# Patient Record
Sex: MALE | Race: WHITE | NOT HISPANIC OR LATINO | Employment: FULL TIME | URBAN - METROPOLITAN AREA
[De-identification: names, ages, dates, MRNs, and addresses within clinical notes are randomized per-mention and may not be internally consistent; named-entity substitution may affect disease eponyms.]

---

## 2017-03-13 DIAGNOSIS — F33.1 MAJOR DEPRESSIVE DISORDER, RECURRENT EPISODE, MODERATE (H): ICD-10-CM

## 2017-03-13 RX ORDER — CITALOPRAM HYDROBROMIDE 20 MG/1
TABLET ORAL
Qty: 90 TABLET | Refills: 0 | OUTPATIENT
Start: 2017-03-13

## 2017-03-13 NOTE — TELEPHONE ENCOUNTER
Refused Prescriptions:                       Disp   Refills    citalopram (CELEXA) 20 MG tablet [Pharmacy*90 tab*0        Sig: TAKE 1 TABLET(20 MG) BY MOUTH DAILY  Refused By: GALLITO VALDIVIA  Reason for Refusal: Request already responded to by other means (phone, fax, etc.)  Reason for Refusal Comment: called in 30 today 3-    Sherin  [phone] (home)

## 2017-05-02 ENCOUNTER — TELEPHONE (OUTPATIENT)
Dept: FAMILY MEDICINE | Facility: CLINIC | Age: 57
End: 2017-05-02

## 2017-08-04 ENCOUNTER — OFFICE VISIT (OUTPATIENT)
Dept: FAMILY MEDICINE | Facility: CLINIC | Age: 57
End: 2017-08-04

## 2017-08-04 VITALS
WEIGHT: 226.8 LBS | SYSTOLIC BLOOD PRESSURE: 134 MMHG | TEMPERATURE: 98.6 F | BODY MASS INDEX: 31.75 KG/M2 | HEIGHT: 71 IN | OXYGEN SATURATION: 96 % | HEART RATE: 75 BPM | DIASTOLIC BLOOD PRESSURE: 86 MMHG

## 2017-08-04 DIAGNOSIS — R41.9 COGNITIVE COMPLAINTS: ICD-10-CM

## 2017-08-04 DIAGNOSIS — F33.1 MODERATE EPISODE OF RECURRENT MAJOR DEPRESSIVE DISORDER (H): Primary | ICD-10-CM

## 2017-08-04 DIAGNOSIS — Z23 NEED FOR VACCINATION: ICD-10-CM

## 2017-08-04 DIAGNOSIS — Z71.89 ACP (ADVANCE CARE PLANNING): ICD-10-CM

## 2017-08-04 PROCEDURE — 99214 OFFICE O/P EST MOD 30 MIN: CPT | Mod: 25 | Performed by: FAMILY MEDICINE

## 2017-08-04 PROCEDURE — 90471 IMMUNIZATION ADMIN: CPT | Performed by: FAMILY MEDICINE

## 2017-08-04 PROCEDURE — 90715 TDAP VACCINE 7 YRS/> IM: CPT | Performed by: FAMILY MEDICINE

## 2017-08-04 RX ORDER — BUPROPION HCL 150 MG
150 TABLET,SUSTAINED-RELEASE 12 HR ORAL 2 TIMES DAILY
Qty: 60 TABLET | Refills: 1 | Status: SHIPPED | OUTPATIENT
Start: 2017-08-04 | End: 2017-09-28

## 2017-08-04 ASSESSMENT — PATIENT HEALTH QUESTIONNAIRE - PHQ9: SUM OF ALL RESPONSES TO PHQ QUESTIONS 1-9: 13

## 2017-08-04 NOTE — PROGRESS NOTES
SUBJECTIVE:                                                    Shaquille Roberts is a 56 year old male who presents to clinic today for the following health issues:    Worries that he has ADD- has problems at work-  Multiple tasks , not completing  He believes he  under performed at college-   Wished he had neuropsych testing first year in college    Not drinking alcohol- he has concerns about his memory  New job 18 months ago- likes, less stress  Not exercising    Family history of cognitive impairment- this is also a worry for him  Mother is 82- having some cognitive problems    History of depression: symptoms are not in remission  PHQ9 scores 13     Current substance abuse:  None  Anxiety or Panic symptoms:  No    PHQ-9  English  PHQ-9   Any Language      Amount of exercise or physical activity: no regular exercise    Problems taking medications regularly: No    Medication side effects: none  Diet: regular (no restrictions)      Problem list and histories reviewed & adjusted, as indicated.  Additional history: as documented    Patient Active Problem List   Diagnosis     Other type of migraine     Health Care Home     Major depressive disorder, recurrent episode, moderate (H)     Non morbid obesity due to excess calories     ACP (advance care planning)     Past Surgical History:   Procedure Laterality Date     C ANESTH,LUMBAR DISCOGRAPHY  2003    L3L4       COLONOSCOPY  9/2016    polyps/ repeat in 5 yrs     REMOVAL OF SPERM DUCT(S)  2/04    Dr Gomez       Social History   Substance Use Topics     Smoking status: Former Smoker     Packs/day: 1.00     Years: 15.00     Types: Cigarettes     Quit date: 1/1/1999     Smokeless tobacco: Never Used     Alcohol use 1.0 oz/week     2 drink(s) per week     Family History   Problem Relation Age of Onset     DIABETES No family hx of      C.A.D. No family hx of      CEREBROVASCULAR DISEASE Maternal Grandfather      in his 50s     Cancer - colorectal No family hx of       "Prostate Cancer No family hx of      Alzheimer Disease No family hx of      Hypertension Mother          Current Outpatient Prescriptions   Medication Sig Dispense Refill     WELLBUTRIN  MG 12 hr tablet Take 1 tablet (150 mg) by mouth 2 times daily 60 tablet 1     citalopram (CELEXA) 20 MG tablet Take 1 tablet (20 mg) by mouth daily 30 tablet 0     SUMAtriptan (IMITREX) 50 MG tablet Take 1 tablet (50 mg) by mouth See Admin Instructions 18 tablet 2         Reviewed and updated as needed this visit by clinical staffTobacco  Allergies  Meds  Problems       Reviewed and updated as needed this visit by Provider         ROS:  Constitutional, HEENT, cardiovascular, pulmonary, gi and gu systems are negative, except as otherwise noted.      OBJECTIVE:   /86 (BP Location: Right arm, Patient Position: Chair, Cuff Size: Adult Large)  Pulse 75  Temp 98.6  F (37  C) (Oral)  Ht 1.803 m (5' 11\")  Wt 102.9 kg (226 lb 12.8 oz)  SpO2 96%  BMI 31.63 kg/m2  Body mass index is 31.63 kg/(m^2).   ALert and oriented times 3; Coherent speech, normal rate and volume, able to articulate, logical thoughts, able to abstract reason, no tangential thoughts, no hallucinations or delusions Affect is pleasant      Diagnostic Test Results:  none     ASSESSMENT/PLAN:     Problem List Items Addressed This Visit     ACP (advance care planning)      Other Visit Diagnoses     Moderate episode of recurrent major depressive disorder (H)    -  Primary    Relevant Medications    WELLBUTRIN  MG 12 hr tablet    Need for vaccination        Relevant Orders    TDAP VACCINE (BOOSTRIX) (Completed)    VACCINE ADMINISTRATION, INITIAL (Completed)           CONSULTATION/REFERRAL for neuropsych testing- will have Merly help with provider based on insurance  Change in antidepressant-   DC Citalopram  Trial of wellbutrin    Recheck 4-8 weeks  Henny Gonzalez MD  [facility]    More than 50% of visit spent in " counseling.

## 2017-08-04 NOTE — MR AVS SNAPSHOT
"              After Visit Summary   2017    Shaquille Roberts    MRN: [id number]           Patient Information     Date Of Birth          1960        Visit Information        Provider Department      2017 12:00 PM Henny Gonzalez MD [facility]        Today's Diagnoses     Moderate episode of recurrent major depressive disorder (H)    -  1    ACP (advance care planning)        Need for vaccination          Care Instructions    Recheck in one month          Follow-ups after your visit        Who to contact     If you have questions or need follow up information about today's clinic visit or your schedule please contact [facility] directly at [phone].  Normal or non-critical lab and imaging results will be communicated to you by [employer]shart, letter or phone within 4 business days after the clinic has received the results. If you do not hear from us within 7 days, please contact the clinic through [employer] or phone. If you have a critical or abnormal lab result, we will notify you by phone as soon as possible.  Submit refill requests through Reading Room or call your pharmacy and they will forward the refill request to us. Please allow 3 business days for your refill to be completed.          Additional Information About Your Visit        MyChart Information     Reading Room lets you send messages to your doctor, view your test results, renew your prescriptions, schedule appointments and more. To sign up, go to [email or web address] . Click on \"Log in\" on the left side of the screen, which will take you to the Welcome page. Then click on \"Sign up Now\" on the right side of the page.     You will be asked to enter the access code listed below, as well as some personal information. Please follow the directions to create your username and password.     Your access code is: [email or web address]  Expires: 2017 12:39 PM     Your access code will  in 90 days. If you need " "help or a new code, please call your [place] clinic or [phone].        Care EveryWhere ID     This is your Care EveryWhere ID. This could be used by other organizations to access your [place] medical records  [id number]        Your Vitals Were     Pulse Temperature Height Pulse Oximetry BMI (Body Mass Index)       75 98.6  F (37  C) (Oral) 1.803 m (5' 11\") 96% 31.63 kg/m2        Blood Pressure from Last 3 Encounters:   08/04/17 134/86   08/03/16 122/84   01/07/16 132/80    Weight from Last 3 Encounters:   08/04/17 102.9 kg (226 lb 12.8 oz)   08/03/16 103.1 kg (227 lb 3.2 oz)   01/07/16 102.3 kg (225 lb 9.6 oz)              We Performed the Following     TDAP VACCINE (BOOSTRIX)     VACCINE ADMINISTRATION, INITIAL          Today's Medication Changes          These changes are accurate as of: 8/4/17 12:39 PM.  If you have any questions, ask your nurse or doctor.               Start taking these medicines.        Dose/Directions    WELLBUTRIN  MG 12 hr tablet   Used for:  Moderate episode of recurrent major depressive disorder (H)   Generic drug:  buPROPion   Started by:  Henny Gonzalez MD        Dose:  150 mg   Take 1 tablet (150 mg) by mouth 2 times daily   Quantity:  60 tablet   Refills:  1            Where to get your medicines      These medications were sent to [id number]  [place] - [address]  [address], [place] [address]     Phone:  [phone]     WELLBUTRIN  MG 12 hr tablet                Primary Care Provider Office Phone # Fax #    Henny Gonzalez [phone]       [facility] [address]  [facility] [address]        Equal Access to Services     [facility]: Jeanette figueroa Somena, waaxda luqadaha, qaybta kaalmada adeileanayafrannie, mike poon . [facility] [phone].    ATENCIÓN: Si juliala renee, tiene a pizano disposición servicios " irma de asistencia lingüística. Jazzy acosta [phone].    We comply with applicable federal civil rights laws and [place] laws. We do not discriminate on the basis of race, color, national origin, age, disability sex, sexual orientation or gender identity.            Thank you!     Thank you for choosing [facility]  for your care. Our goal is always to provide you with excellent care. Hearing back from our patients is one way we can continue to improve our services. Please take a few minutes to complete the written survey that you may receive in the mail after your visit with us. Thank you!             Your Updated Medication List - Protect others around you: Learn how to safely use, store and throw away your medicines at www.disposemymeds.org.          This list is accurate as of: 8/4/17 12:39 PM.  Always use your most recent med list.                   Brand Name Dispense Instructions for use Diagnosis    citalopram 20 MG tablet    celeXA    30 tablet    Take 1 tablet (20 mg) by mouth daily    Major depressive disorder, recurrent episode, moderate (H)       SUMAtriptan 50 MG tablet    IMITREX    18 tablet    Take 1 tablet (50 mg) by mouth See Admin Instructions    Migraine, unspecified, without mention of intractable migraine without mention of status migrainosus       WELLBUTRIN  MG 12 hr tablet   Generic drug:  buPROPion     60 tablet    Take 1 tablet (150 mg) by mouth 2 times daily    Moderate episode of recurrent major depressive disorder (H)

## 2017-08-04 NOTE — NURSING NOTE
Shaquille is here for a medication recheck.- Pt is fasting.  Pt would like to find out which other options are available to him.     Pre-Visit Screening :  Immunizations : up to date  Colon Screening : is up to date  Asthma Action Test/Plan : NA  PHQ9/GAD7 :  Done today      Pulse - regular  My Chart - accepts    CLASSIFICATION OF OVERWEIGHT AND OBESITY BY BMI                         Obesity Class           BMI(kg/m2)  Underweight                                    < 18.5  Normal                                         18.5-24.9  Overweight                                     25.0-29.9  OBESITY                     I                  30.0-34.9                              II                 35.0-39.9  EXTREME OBESITY             III                >40                             Patient's  BMI Body mass index is 31.63 kg/(m^2).  http://hin.nhlbi.nih.gov/menuplanner/menu.cgi  Questioned patient about current smoking habits.  Pt. quit smoking some time ago.    LILY Ocampo ([facility])

## 2017-08-29 DIAGNOSIS — F33.1 MODERATE EPISODE OF RECURRENT MAJOR DEPRESSIVE DISORDER (H): ICD-10-CM

## 2017-08-29 RX ORDER — BUPROPION HYDROCHLORIDE 150 MG/1
TABLET, EXTENDED RELEASE ORAL
Qty: 180 TABLET | Refills: 1 | OUTPATIENT
Start: 2017-08-29

## 2017-08-29 NOTE — TELEPHONE ENCOUNTER
Refused Prescriptions:                       Disp   Refills    buPROPion (WELLBUTRIN SR) 150 MG 12 hr tab*180 ta*1        Sig: TAKE 1 TABLET(150 MG) BY MOUTH TWICE DAILY  Refused By: GALLITO VALDIVIA  Reason for Refusal: Patient needs appointment  Reason for Refusal Comment: refilled 8-2017 pt due for ov    [phone] (home)

## 2017-09-28 DIAGNOSIS — F33.1 MODERATE EPISODE OF RECURRENT MAJOR DEPRESSIVE DISORDER (H): ICD-10-CM

## 2017-09-28 RX ORDER — BUPROPION HYDROCHLORIDE 150 MG/1
TABLET, EXTENDED RELEASE ORAL
Qty: 60 TABLET | Refills: 0 | OUTPATIENT
Start: 2017-09-28

## 2017-09-28 RX ORDER — BUPROPION HCL 150 MG
150 TABLET,SUSTAINED-RELEASE 12 HR ORAL 2 TIMES DAILY
Qty: 60 TABLET | Refills: 0 | COMMUNITY
Start: 2017-09-28 | End: 2017-10-23

## 2017-09-28 NOTE — TELEPHONE ENCOUNTER
WELLBUTRIN  MG   [facility] Saunders   30 days   Pt due for a FASTING appt  E[place]  [phone] (home)

## 2017-10-23 ENCOUNTER — TELEPHONE (OUTPATIENT)
Dept: FAMILY MEDICINE | Facility: CLINIC | Age: 57
End: 2017-10-23

## 2017-10-23 DIAGNOSIS — F33.1 MODERATE EPISODE OF RECURRENT MAJOR DEPRESSIVE DISORDER (H): ICD-10-CM

## 2017-10-23 RX ORDER — BUPROPION HCL 150 MG
150 TABLET,SUSTAINED-RELEASE 12 HR ORAL 2 TIMES DAILY
Qty: 30 TABLET | Refills: 0 | COMMUNITY
Start: 2017-10-23 | End: 2017-11-10

## 2017-10-23 NOTE — TELEPHONE ENCOUNTER
Called in #30 of his Wellbutrin to [facility]. Pt has upcoming appointment and only needed two weeks.

## 2017-11-10 ENCOUNTER — OFFICE VISIT (OUTPATIENT)
Dept: FAMILY MEDICINE | Facility: CLINIC | Age: 57
End: 2017-11-10

## 2017-11-10 VITALS
SYSTOLIC BLOOD PRESSURE: 142 MMHG | RESPIRATION RATE: 20 BRPM | WEIGHT: 221.4 LBS | DIASTOLIC BLOOD PRESSURE: 78 MMHG | TEMPERATURE: 98.3 F | HEIGHT: 71 IN | BODY MASS INDEX: 31 KG/M2 | HEART RATE: 84 BPM

## 2017-11-10 DIAGNOSIS — Z13.9 SCREENING FOR CONDITION: ICD-10-CM

## 2017-11-10 DIAGNOSIS — F33.1 MODERATE EPISODE OF RECURRENT MAJOR DEPRESSIVE DISORDER (H): Primary | ICD-10-CM

## 2017-11-10 PROCEDURE — 36415 COLL VENOUS BLD VENIPUNCTURE: CPT | Performed by: FAMILY MEDICINE

## 2017-11-10 PROCEDURE — 80076 HEPATIC FUNCTION PANEL: CPT | Mod: 90 | Performed by: FAMILY MEDICINE

## 2017-11-10 PROCEDURE — 80061 LIPID PANEL: CPT | Mod: 90 | Performed by: FAMILY MEDICINE

## 2017-11-10 PROCEDURE — 99213 OFFICE O/P EST LOW 20 MIN: CPT | Performed by: FAMILY MEDICINE

## 2017-11-10 PROCEDURE — 86803 HEPATITIS C AB TEST: CPT | Mod: 90 | Performed by: FAMILY MEDICINE

## 2017-11-10 RX ORDER — BUPROPION HCL 150 MG
150 TABLET,SUSTAINED-RELEASE 12 HR ORAL 2 TIMES DAILY
Qty: 180 TABLET | Refills: 1 | Status: SHIPPED | OUTPATIENT
Start: 2017-11-10 | End: 2018-05-08

## 2017-11-10 ASSESSMENT — PATIENT HEALTH QUESTIONNAIRE - PHQ9: SUM OF ALL RESPONSES TO PHQ QUESTIONS 1-9: 3

## 2017-11-10 NOTE — PROGRESS NOTES
SUBJECTIVE:   Shaquille Roberts is a 56 year old male who presents to clinic today for the following health issues:      Depression Followup    Status since last visit: Improved     See PHQ-9 for current symptoms.      Other associated symptoms: problem with focus- improved on wellbutrin- better at multitasking at work     Complicating factors:   Significant life event:  No   Current substance abuse:  None- stopped drinking all alcohol- feels better  Anxiety or Panic symptoms:  No    PHQ-9 Score and MyChart F/U Questions 1/7/2016 8/3/2016 8/4/2017   Total Score 18 0 13   Q9: Suicide Ideation More than half the days Not at all More than half the days     In the past two weeks have you had thoughts of suicide or self-harm?  No.    Do you have concerns about your personal safety or the safety of others?   No     However, he does share that he had suicidal ideations in the first weeks of starting wellbutrin- this has passed    PHQ-9  English  PHQ-9   Any Language  Suicide Assessment Five-step Evaluation and Treatment (SAFE-T)      Amount of exercise or physical activity: discussion of need to make exercise routine- consider am before work    Problems taking medications regularly: No    Medication side effects: none    Diet: regular (no restrictions)      Problem list and histories reviewed & adjusted, as indicated.  Additional history: as documented    Patient Active Problem List   Diagnosis     Other type of migraine     Health Care Home     Major depressive disorder, recurrent episode, moderate (H)     Non morbid obesity due to excess calories     ACP (advance care planning)     Past Surgical History:   Procedure Laterality Date     C ANESTH,LUMBAR DISCOGRAPHY  2003    L3L4       COLONOSCOPY  9/2016    polyps/ repeat in 5 yrs     REMOVAL OF SPERM DUCT(S)  2/04    Dr Gomez       Social History   Substance Use Topics     Smoking status: Former Smoker     Packs/day: 1.00     Years: 15.00     Types: Cigarettes     Quit  "date: 1/1/1999     Smokeless tobacco: Never Used     Alcohol use 1.0 oz/week     2 drink(s) per week     Family History   Problem Relation Age of Onset     DIABETES No family hx of      C.A.D. No family hx of      CEREBROVASCULAR DISEASE Maternal Grandfather      in his 50s     Cancer - colorectal No family hx of      Prostate Cancer No family hx of      Alzheimer Disease No family hx of      Hypertension Mother          Current Outpatient Prescriptions   Medication Sig Dispense Refill     WELLBUTRIN  MG 12 hr tablet Take 1 tablet (150 mg) by mouth 2 times daily 180 tablet 1         Reviewed and updated as needed this visit by clinical staff     Reviewed and updated as needed this visit by Provider       ROS:  Constitutional, HEENT, cardiovascular, pulmonary, gi and gu systems are negative, except as otherwise noted.      OBJECTIVE:   /78 (BP Location: Right arm, Patient Position: Chair, Cuff Size: Adult Large)  Pulse 84  Temp 98.3  F (36.8  C) (Oral)  Resp 20  Ht 1.81 m (5' 11.25\")  Wt 100.4 kg (221 lb 6.4 oz)  BMI 30.66 kg/m2  Body mass index is 30.66 kg/(m^2).   ALert and oriented times 3; Coherent speech, normal rate and volume, able to articulate, logical thoughts, able to abstract reason, no tangential thoughts, no hallucinations or delusions. Affect is pleasant      Diagnostic Test Results:  Results for orders placed or performed in visit on 11/10/17   Lipid Profile ([facility])   Result Value Ref Range    Cholesterol 219 (H) <200 mg/dL    HDL Cholesterol 52 >40 mg/dL    Triglycerides 106 <150 mg/dL    LDL Cholesterol Calculated 145 (H) mg/dL (calc)    Cholesterol/HDL Ratio 4.2 <5.0 (calc)    Non HDL Cholesterol 167 (H) <130 mg/dL (calc)   Hepatits C antibody (QUEST)   Result Value Ref Range    HCV Antibody NON-REACTIVE NON-REACTIVE    SIGNAL TO CUT OFF - [facility] 0.01 <1.00   HEPATIC PANEL ([facility])   Result Value Ref Range    Protein Total 6.7 6.1 - 8.1 g/dL    Albumin 4.1 3.6 - 5.1 g/dL    Globulin " "Calculated 2.6 1.9 - 3.7 g/dL (calc)    A/G Ratio 1.6 1.0 - 2.5 (calc)    Bilirubin Total 0.5 0.2 - 1.2 mg/dL    Bilirubin Direct 0.1 < OR = 0.2 mg/dL    Bilirubin Indirect 0.4 0.2 - 1.2 mg/dL (calc)    Alkaline Phosphatase 77 40 - 115 U/L    AST 16 10 - 35 U/L    ALT 25 9 - 46 U/L       ASSESSMENT/PLAN:     Problem List Items Addressed This Visit     None      Visit Diagnoses     Moderate episode of recurrent major depressive disorder (H)    -  Primary    Relevant Medications    WELLBUTRIN  MG 12 hr tablet    Screening for condition        Relevant Orders    Lipid Profile (QUEST) (Completed)    VENOUS COLLECTION (Completed)    Hepatits C antibody (QUEST) (Completed)    HEPATIC PANEL (QUEST) (Completed)           BMI:   Estimated body mass index is 30.66 kg/(m^2) as calculated from the following:    Height as of this encounter: 1.81 m (5' 11.25\").    Weight as of this encounter: 100.4 kg (221 lb 6.4 oz).   Weight management plan: Discussed healthy diet and exercise guidelines and patient will follow up in 6 months in clinic to re-evaluate.        MEDICATIONS:  Continue current medications without change  FUTURE APPOINTMENTS:       - Follow-up visit in 6 months  Regular exercise    Henny Gonzalez MD  [facility]  "

## 2017-11-10 NOTE — MR AVS SNAPSHOT
"              After Visit Summary   11/10/2017    Shaquille Roberts    MRN: [id number]           Patient Information     Date Of Birth          1960        Visit Information        Provider Department      11/10/2017 7:30 AM Henny Gonzalez MD [facility]        Today's Diagnoses     Moderate episode of recurrent major depressive disorder (H)    -  1    Screening for condition           Follow-ups after your visit        Who to contact     If you have questions or need follow up information about today's clinic visit or your schedule please contact [facility] directly at [phone].  Normal or non-critical lab and imaging results will be communicated to you by MyChart, letter or phone within 4 business days after the clinic has received the results. If you do not hear from us within 7 days, please contact the clinic through Rescale[employer] or phone. If you have a critical or abnormal lab result, we will notify you by phone as soon as possible.  Submit refill requests through Vastari or call your pharmacy and they will forward the refill request to us. Please allow 3 business days for your refill to be completed.          Additional Information About Your Visit        MyChart Information     Vastari gives you secure access to your electronic health record. If you see a primary care provider, you can also send messages to your care team and make appointments. If you have questions, please call your primary care clinic.  If you do not have a primary care provider, please call [phone] and they will assist you.        Care EveryWhere ID     This is your Care EveryWhere ID. This could be used by other organizations to access your [place] medical records  [id number]        Your Vitals Were     Pulse Temperature Respirations Height BMI (Body Mass Index)       84 98.3  F (36.8  C) (Oral) 20 1.81 m (5' 11.25\") 30.66 kg/m2        Blood Pressure from Last 3 Encounters: "   11/10/17 142/78   08/04/17 134/86   08/03/16 122/84    Weight from Last 3 Encounters:   11/10/17 100.4 kg (221 lb 6.4 oz)   08/04/17 102.9 kg (226 lb 12.8 oz)   08/03/16 103.1 kg (227 lb 3.2 oz)              We Performed the Following     HEPATIC PANEL ([facility])     Hepatits C antibody (QUEST)     Lipid Profile (QUEST)     VENOUS COLLECTION          Where to get your medicines      These medications were sent to [facility] - [place], [place] - [address]  [address], [place] [address]     Phone:  [phone]     WELLBUTRIN  MG 12 hr tablet          Primary Care Provider Office Phone # Fax #    Henny Gonzalez [phone] [address] [address]        Equal Access to Services     [facility] : Hadii aad ku hadasho Soomaali, waaxda luqadaha, qaybta kaalmada adeegyada, waxay idiin haytawannan blanca poon . So [facility] [phone].    ATENCIÓN: Si habla español, tiene a pizano disposición servicios gratuitos de asistencia lingüística. LlMount St. [facility] [phone].    We comply with applicable federal civil rights laws and [place] laws. We do not discriminate on the basis of race, color, national origin, age, disability, sex, sexual orientation, or gender identity.            Thank you!     Thank you for choosing [facility]  for your care. Our goal is always to provide you with excellent care. Hearing back from our patients is one way we can continue to improve our services. Please take a few minutes to complete the written survey that you may receive in the mail after your visit with us. Thank you!             Your Updated Medication List - Protect others around you: Learn how to safely use, store and throw away your medicines at www.disposemymeds.org.          This list is accurate as of: 11/10/17 11:59 PM.  Always use your most recent med list.                   Brand Name Dispense  Instructions for use Diagnosis    WELLBUTRIN  MG 12 hr tablet   Generic drug:  buPROPion     180 tablet    Take 1 tablet (150 mg) by mouth 2 times daily    Moderate episode of recurrent major depressive disorder (H)

## 2017-11-10 NOTE — NURSING NOTE
Shaquille Roberts is here for a medication check and also fasting blood work.  Questioned patient about current smoking habits.  Pt. quit smoking some time ago.  PULSE regular  My Chart: active  CLASSIFICATION OF OVERWEIGHT AND OBESITY BY BMI                        Obesity Class           BMI(kg/m2)  Underweight                                    < 18.5  Normal                                         18.5-24.9  Overweight                                     25.0-29.9  OBESITY                     I                  30.0-34.9                             II                 35.0-39.9  EXTREME OBESITY             III                >40                            Patient's  BMI Body mass index is 30.66 kg/(m^2).  http://hin.nhlbi.nih.gov/menuplanner/menu.cgi  Pre-visit planning  Immunizations - up to date  Colonoscopy - is up to date  Mammogram -   Asthma -   PHQ9 -  Completed today  FELIBERTO-7 -

## 2017-11-11 LAB
ALBUMIN SERPL-MCNC: 4.1 G/DL (ref 3.6–5.1)
ALBUMIN/GLOB SERPL: 1.6 (CALC) (ref 1–2.5)
ALP SERPL-CCNC: 77 U/L (ref 40–115)
ALT SERPL-CCNC: 25 U/L (ref 9–46)
AST SERPL-CCNC: 16 U/L (ref 10–35)
BILIRUB SERPL-MCNC: 0.5 MG/DL (ref 0.2–1.2)
BILIRUBIN, DIRECT: 0.1 MG/DL (ref 0–0.5)
BILIRUBIN,INDIRECT: 0.4 MG/DL (CALC) (ref 0.2–1.2)
CHOLEST SERPL-MCNC: 219 MG/DL
CHOLEST/HDLC SERPL: 4.2 (CALC)
GLOBULIN, CALCULATED - QUEST: 2.6 G/DL (CALC) (ref 1.9–3.7)
HCV AB - QUEST: NORMAL
HDLC SERPL-MCNC: 52 MG/DL
LDLC SERPL CALC-MCNC: 145 MG/DL (CALC)
NONHDLC SERPL-MCNC: 167 MG/DL (CALC)
PROT SERPL-MCNC: 6.7 G/DL (ref 6.1–8.1)
SIGNAL TO CUT OFF - QUEST: 0.01
TRIGL SERPL-MCNC: 106 MG/DL

## 2017-11-21 ENCOUNTER — OFFICE VISIT (OUTPATIENT)
Dept: FAMILY MEDICINE | Facility: CLINIC | Age: 57
End: 2017-11-21

## 2017-11-21 ENCOUNTER — TRANSFERRED RECORDS (OUTPATIENT)
Dept: FAMILY MEDICINE | Facility: CLINIC | Age: 57
End: 2017-11-21

## 2017-11-21 VITALS
BODY MASS INDEX: 30.94 KG/M2 | HEIGHT: 71 IN | DIASTOLIC BLOOD PRESSURE: 80 MMHG | TEMPERATURE: 98.2 F | SYSTOLIC BLOOD PRESSURE: 138 MMHG | WEIGHT: 221 LBS | HEART RATE: 80 BPM | RESPIRATION RATE: 20 BRPM

## 2017-11-21 DIAGNOSIS — B00.1 COLD SORE: ICD-10-CM

## 2017-11-21 DIAGNOSIS — H91.91 ACUTE HEARING LOSS, RIGHT: Primary | ICD-10-CM

## 2017-11-21 PROCEDURE — 92567 TYMPANOMETRY: CPT | Performed by: PHYSICIAN ASSISTANT

## 2017-11-21 PROCEDURE — 87255 GENET VIRUS ISOLATE HSV: CPT | Mod: 90 | Performed by: PHYSICIAN ASSISTANT

## 2017-11-21 PROCEDURE — 99213 OFFICE O/P EST LOW 20 MIN: CPT | Mod: 25 | Performed by: PHYSICIAN ASSISTANT

## 2017-11-21 NOTE — MR AVS SNAPSHOT
After Visit Summary   11/21/2017    Shaquille Roberts    MRN: [id number]           Patient Information     Date Of Birth          1960        Visit Information        Provider Department      11/21/2017 10:15 AM Gilda Gruber PA [facility]        Today's Diagnoses     Acute hearing loss, right    -  1    Cold sore           Follow-ups after your visit        Additional Services     OTOLARYNGOLOGY REFERRAL       Your provider has referred you to: N: [facility] [place] [phone]   [email or web address]    Please be aware that coverage of these services is subject to the terms and limitations of your health insurance plan.  Call member services at your health plan with any benefit or coverage questions.      Please bring the following to your appointment:  >>   Any x-rays, CTs or MRIs which have been performed.  Contact the facility where they were done to arrange for  prior to your scheduled appointment.  Any new CT, MRI or other procedures ordered by your specialist must be performed at a [facility] or coordinated by your clinic's referral office.    >>   List of current medications   >>   This referral request   >>   Any documents/labs given to you for this referral                  Who to contact     If you have questions or need follow up information about today's clinic visit or your schedule please contact [facility] directly at [phone].  Normal or non-critical lab and imaging results will be communicated to you by MyChart, letter or phone within 4 business days after the clinic has received the results. If you do not hear from us within 7 days, please contact the clinic through MyChart or phone. If you have a critical or abnormal lab result, we will notify you by phone as soon as possible.  Submit refill requests through "[employer] or call your pharmacy and they will forward  "the refill request to us. Please allow 3 business days for your refill to be completed.          Additional Information About Your Visit        [employer] Information     [employer] gives you secure access to your electronic health record. If you see a primary care provider, you can also send messages to your care team and make appointments. If you have questions, please call your primary care clinic.  If you do not have a primary care provider, please call [phone] and they will assist you.        Care EveryWhere ID     This is your Care EveryWhere ID. This could be used by other organizations to access your [facility] medical records  [id number]        Your Vitals Were     Pulse Temperature Respirations Height BMI (Body Mass Index)       80 98.2  F (36.8  C) (Oral) 20 1.81 m (5' 11.25\") 30.61 kg/m2        Blood Pressure from Last 3 Encounters:   11/21/17 138/80   11/10/17 142/78   08/04/17 134/86    Weight from Last 3 Encounters:   11/21/17 100.2 kg (221 lb)   11/10/17 100.4 kg (221 lb 6.4 oz)   08/04/17 102.9 kg (226 lb 12.8 oz)              We Performed the Following     Herpes Simplex Virus Culture ([facility])**Refrig**     OTOLARYNGOLOGY REFERRAL     TYMPANOMETRY        Primary Care Provider Office Phone # Fax #    Henny Gonzalez [phone]       [address] [address]        Equal Access to Services     [facility] : Hadii aad ku hadasho Soomaali, waaxda luqadaha, qaybta kaalmada adeegyada, mike poon . So [facility] [phone].    ATENCIÓN: Si habla renee, tiene a pizano disposición servicios gratuitos de asistencia lingüística. Jazzy al [phone].    We comply with applicable federal civil rights laws and [place] laws. We do not discriminate on the basis of race, color, national origin, age, disability, sex, sexual orientation, or gender identity.            Thank you!     Thank you for choosing [facility]  for " your care. Our goal is always to provide you with excellent care. Hearing back from our patients is one way we can continue to improve our services. Please take a few minutes to complete the written survey that you may receive in the mail after your visit with us. Thank you!             Your Updated Medication List - Protect others around you: Learn how to safely use, store and throw away your medicines at www.disposemymeds.org.          This list is accurate as of: 11/21/17 11:59 PM.  Always use your most recent med list.                   Brand Name Dispense Instructions for use Diagnosis    WELLBUTRIN  MG 12 hr tablet   Generic drug:  buPROPion     180 tablet    Take 1 tablet (150 mg) by mouth 2 times daily    Moderate episode of recurrent major depressive disorder (H)

## 2017-11-21 NOTE — NURSING NOTE
BFP AUDIOGRAM    Right Ear                    Left Ear  250 Hz:   15 decibel loss    250 Hz:   5 decibel loss   500 Hz:   20decibel loss     500 Hz:   10 decibel loss  1000 Hz: 35 decibel loss    1000 Hz: 20 decibel loss  2000 Hz: 40 decibel loss    2000 Hz: 30 decibel loss  4000 Hz:  50 decibel loss    4000 Hz:  30 decibel loss   8000 Hz:: 65 decibel loss   8000 Hz:: 45 decibel loss

## 2017-11-21 NOTE — PROGRESS NOTES
"SUBJECTIVE:                                                    Shaquille Roberts is a 56 year old male who presents to clinic today for the following health issues:    Sahquille is here with decreased hearing in the right ear that started about 1 week ago.  Abrupt onset with ringing.  Yesterday developed some dizziness described as spinning sensation.  He denies emesis. Denies recent travel or URI sx.  Of note he does have a \"cold sore\" on upper left lip. Had pain initially then the area scabbed over. He used Camphor and Blistex.  He notes slight tingling superior to the sore.   Left ear is w/o hearing loss    No headaches, no vision changes  No sore throat    No sick contacts             ROS:  C: NEGATIVE for fever, chills, change in weight  E: NEGATIVE for vision changes or irritation  R: NEGATIVE for significant cough or SOB  CV: NEGATIVE for chest pain, palpitations or peripheral edema  GI: NEGATIVE for nausea, abdominal pain, heartburn, or change in bowel habits  : NEGATIVE for frequency, dysuria, or hematuria        BP Readings from Last 3 Encounters:   11/21/17 138/80   11/10/17 142/78   08/04/17 134/86    Wt Readings from Last 3 Encounters:   11/21/17 100.2 kg (221 lb)   11/10/17 100.4 kg (221 lb 6.4 oz)   08/04/17 102.9 kg (226 lb 12.8 oz)            Patient Active Problem List   Diagnosis     Other type of migraine     Health Care Home     Major depressive disorder, recurrent episode, moderate (H)     Non morbid obesity due to excess calories     ACP (advance care planning)     Past Surgical History:   Procedure Laterality Date     C ANESTH,LUMBAR DISCOGRAPHY  2003    L3L4       COLONOSCOPY  9/2016    polyps/ repeat in 5 yrs     REMOVAL OF SPERM DUCT(S)  2/04    Dr Gomez       Social History   Substance Use Topics     Smoking status: Former Smoker     Packs/day: 1.00     Years: 15.00     Types: Cigarettes     Quit date: 1/1/1999     Smokeless tobacco: Never Used     Alcohol use 1.0 oz/week     2 drink(s) per " "week     Family History   Problem Relation Age of Onset     DIABETES No family hx of      C.A.D. No family hx of      CEREBROVASCULAR DISEASE Maternal Grandfather      in his 50s     Cancer - colorectal No family hx of      Prostate Cancer No family hx of      Alzheimer Disease No family hx of      Hypertension Mother          Current Outpatient Prescriptions   Medication Sig Dispense Refill     WELLBUTRIN  MG 12 hr tablet Take 1 tablet (150 mg) by mouth 2 times daily 180 tablet 1       No Known Allergies    OBJECTIVE:                                                    /80 (BP Location: Left arm, Patient Position: Chair, Cuff Size: Adult Regular)  Pulse 80  Temp 98.2  F (36.8  C) (Oral)  Resp 20  Ht 1.81 m (5' 11.25\")  Wt 100.2 kg (221 lb)  BMI 30.61 kg/m2 Body mass index is 30.61 kg/(m^2).     GENERAL: alert and no distress  HEAD: Normocephalic, atraumatic  EYES: Eyes grossly normal to inspection, extraocular movements - intact  EARS:   Right: External ear and canal normal, TM normal  Left: External ear and canal normal, TM normal  NOSE: No discharge noted  MOUTH/THROAT: no ulcers, no lesions, pharynx non-erythematous, no exudates present, tonsils without hypertrophy, mucous membranes moist.   NECK: no tenderness, no adenopathy, no asymmetry, no masses, no stiffness; thyroid- normal to palpation  RESP: lungs clear to auscultation - no crackles, no rhonchi, no wheezes  CV: regular rates and rhythm, normal S1 S2, no S3 or S4 and no murmur, no click or rub -    Neuro: CN II - XII intact  Gait:  Normal gait, Toe and Heel walking normal  Test strength in the following muscles bilaterally: biceps, Triceps, hip flexors, knee flexor/extensors, ankle dorsiflexors and plantarflexors are all normal.   Normal: Test for a pronator drift. Test finger tapping, nose to finger, and heel-knee-shin performance.   Pupils are round, reactive to light, EOM intact in all directions.       Tim and Rinne normal.  Leticia " Hallpike negative     See scanned Tympanogram           ASSESSMENT/PLAN:                                                        ICD-10-CM    1. Acute hearing loss, right H91.91 OTOLARYNGOLOGY REFERRAL     TYMPANOMETRY   2. Cold sore B00.1 Herpes Simplex Virus Culture ([facility])**Refrig**       Urgent [facility] referral - they will work him in this am.  Cold sore culture pending.      Gilda Gruber PA-C  [facility]

## 2017-11-21 NOTE — NURSING NOTE
Questioned patient about current smoking habits.  Pt. quit smoking some time ago.  PULSE regular  My Chart: active  CLASSIFICATION OF OVERWEIGHT AND OBESITY BY BMI                        Obesity Class           BMI(kg/m2)  Underweight                                    < 18.5  Normal                                         18.5-24.9  Overweight                                     25.0-29.9  OBESITY                     I                  30.0-34.9                             II                 35.0-39.9  EXTREME OBESITY             III                >40                            Patient's  BMI Body mass index is 30.61 kg/(m^2).  http://hin.nhlbi.nih.gov/menuplanner/menu.cgi  Pre-visit planning  Immunizations - up to date  Colonoscopy - is up to date  Mammogram -   Asthma -   PHQ9 -    FELIBERTO-7 -

## 2017-11-21 NOTE — NURSING NOTE
Shaquille Roberts is here for right ear pain and dizziness for the past day. Has had an ear ache for the past week.  Also cold sore on lip

## 2017-11-24 LAB
HSV CULTURE - QUEST: NORMAL
SOURCE: NORMAL

## 2017-11-30 ENCOUNTER — TELEPHONE (OUTPATIENT)
Dept: FAMILY MEDICINE | Facility: CLINIC | Age: 57
End: 2017-11-30

## 2017-11-30 NOTE — TELEPHONE ENCOUNTER
Patient called and left a msg that he was suppose to allyssa back with an update on his cold sore and his words were it;s only a memory at this time.      Telephone Information:   Mobile [phone]     Please close

## 2017-12-14 ENCOUNTER — MYC MEDICAL ADVICE (OUTPATIENT)
Dept: FAMILY MEDICINE | Facility: CLINIC | Age: 57
End: 2017-12-14

## 2018-04-23 ENCOUNTER — OFFICE VISIT (OUTPATIENT)
Dept: FAMILY MEDICINE | Facility: CLINIC | Age: 58
End: 2018-04-23

## 2018-04-23 VITALS
BODY MASS INDEX: 31.23 KG/M2 | DIASTOLIC BLOOD PRESSURE: 86 MMHG | OXYGEN SATURATION: 97 % | HEART RATE: 77 BPM | WEIGHT: 230.6 LBS | TEMPERATURE: 99.5 F | HEIGHT: 72 IN | SYSTOLIC BLOOD PRESSURE: 138 MMHG

## 2018-04-23 DIAGNOSIS — J20.9 ACUTE BRONCHITIS, UNSPECIFIED ORGANISM: Primary | ICD-10-CM

## 2018-04-23 PROCEDURE — 99213 OFFICE O/P EST LOW 20 MIN: CPT | Performed by: PHYSICIAN ASSISTANT

## 2018-04-23 RX ORDER — CODEINE PHOSPHATE AND GUAIFENESIN 10; 100 MG/5ML; MG/5ML
1-2 SOLUTION ORAL EVERY 4 HOURS PRN
Qty: 236 ML | Refills: 0 | Status: SHIPPED | OUTPATIENT
Start: 2018-04-23 | End: 2018-04-30

## 2018-04-23 RX ORDER — AZITHROMYCIN 250 MG/1
TABLET, FILM COATED ORAL
Qty: 6 TABLET | Refills: 0 | Status: SHIPPED | OUTPATIENT
Start: 2018-04-23 | End: 2018-07-23

## 2018-04-23 RX ORDER — BENZONATATE 100 MG/1
100 CAPSULE ORAL 3 TIMES DAILY PRN
Qty: 30 CAPSULE | Refills: 0 | Status: SHIPPED | OUTPATIENT
Start: 2018-04-23 | End: 2018-07-23

## 2018-04-23 NOTE — NURSING NOTE
Shaquille is here for cough X 3 weeks, severe, pt has almost thrown up, non productive cough, wakes up in the middle of the night coughing     Pre-Visit Screening :  Immunizations : up to date  Colon Screening : is up to date  Asthma Action Test/Plan : alireza  PHQ9/GAD7 :  Na    Pulse - regular  My Chart - accepts    CLASSIFICATION OF OVERWEIGHT AND OBESITY BY BMI                         Obesity Class           BMI(kg/m2)  Underweight                                    < 18.5  Normal                                         18.5-24.9  Overweight                                     25.0-29.9  OBESITY                     I                  30.0-34.9                              II                 35.0-39.9  EXTREME OBESITY             III                >40                             Patient's  BMI Body mass index is 22.15 kg/(m^2).  http://hin.nhlbi.nih.gov/menuplanner/menu.cgi  Questioned patient about current smoking habits.  Pt. has never smoked.  The patient has verbalized that it is ok to leave a detailed voice message on the patient's cell phone with results/recommendations from this visit.       Verified [phone] phone number:

## 2018-04-23 NOTE — PROGRESS NOTES
"CC: Cough    History:  Shaquille is here today with a cough that has been lasting for the past 3 weeks. Cough wakes him up at night. Has to sleep in a different room and work in an isolated office. Cough is dry, but can feel deeper mucous. Is taking OTC expectorant/suppressant. No fever, but is getting some sweats and chills. Has had pneumonia twice in the past in high school and college. Quit smoking 20 years ago. Pushing fluids, resting. No facial pain, nasal congestion/drainage.     PMH, MEDICATIONS, ALLERGIES, SOCIAL AND FAMILY HISTORY in [facility] and reviewed by me personally.      ROS negative other than the symptoms noted above in the HPI.        Examination   /86 (BP Location: Left arm, Patient Position: Chair, Cuff Size: Adult Large)  Pulse 77  Temp 99.5  F (37.5  C) (Oral)  Ht 1.816 m (5' 11.5\")  Wt 104.6 kg (230 lb 9.6 oz)  SpO2 97%  BMI 31.71 kg/m2       Constitutional: Sitting comfortably, in no acute distress. Vital signs noted  Eyes: pupils equal round reactive to light and accomodation, extra ocular movements intact  Ears: external canals and TMs free of abnormalities  Nose: patent, without mucosal abnormalities  Mouth and throat: without erythema or lesions of the mucosa  Neck:  no adenopathy, trachea midline and normal to palpation  Cardiovascular:  regular rate and rhythm, no murmurs, clicks, or gallops  Respiratory:  normal respiratory rate and rhythm, lungs clear to auscultation  SKIN: No jaundice/pallor/rash.   Psychiatric: mentation appears normal and affect normal/bright        A/P    ICD-10-CM    1. Acute bronchitis, unspecified organism J20.9 azithromycin (ZITHROMAX) 250 MG tablet     benzonatate (TESSALON) 100 MG capsule     guaiFENesin-codeine (ROBITUSSIN AC) 100-10 MG/5ML SOLN solution       DISCUSSION:  Given duration of his symptoms, suspect bacterial bronchitis. Recommended z-pack. Take with food. Warned of side effects. Also provided him with script for Tessalon perles to help " with cough- warned of possible drowsiness. Gave codeine solution to use at night. Warned of drowsiness, addictive potential. Contact me in 1 week if not significantly better.     follow up visit:  As needed    Brisa Whiteside PA-C  [facility]

## 2018-04-23 NOTE — MR AVS SNAPSHOT
After Visit Summary   4/23/2018    Shaquille Roberts    MRN: [id number]           Patient Information     Date Of Birth          1960        Visit Information        Provider Department      4/23/2018 5:30 PM Brisa Whiteside PA-C [facility]        Today's Diagnoses     Acute bronchitis, unspecified organism    -  1       Follow-ups after your visit        Follow-up notes from your care team     Return in about 1 week (around 4/30/2018), or if symptoms worsen or fail to improve.      Who to contact     If you have questions or need follow up information about today's clinic visit or your schedule please contact [facility] directly at [phone].  Normal or non-critical lab and imaging results will be communicated to you by [employer], letter or phone within 4 business days after the clinic has received the results. If you do not hear from us within 7 days, please contact the clinic through [employer] or phone. If you have a critical or abnormal lab result, we will notify you by phone as soon as possible.  Submit refill requests through [employer] or call your pharmacy and they will forward the refill request to us. Please allow 3 business days for your refill to be completed.          Additional Information About Your Visit        [employer] Information     [employer] gives you secure access to your electronic health record. If you see a primary care provider, you can also send messages to your care team and make appointments. If you have questions, please call your primary care clinic.  If you do not have a primary care provider, please call [phone] and they will assist you.        Care EveryWhere ID     This is your Care EveryWhere ID. This could be used by other organizations to access your [facility] medical records  [id number]        Your Vitals Were     Pulse Temperature Height Pulse Oximetry BMI (Body Mass Index)       77 99.5  F (37.5  C) (Oral)  "1.816 m (5' 11.5\") 97% 31.71 kg/m2        Blood Pressure from Last 3 Encounters:   04/23/18 138/86   11/21/17 138/80   11/10/17 142/78    Weight from Last 3 Encounters:   04/23/18 104.6 kg (230 lb 9.6 oz)   11/21/17 100.2 kg (221 lb)   11/10/17 100.4 kg (221 lb 6.4 oz)              Today, you had the following     No orders found for display         Today's Medication Changes          These changes are accurate as of 4/23/18 11:59 PM.  If you have any questions, ask your nurse or doctor.               Start taking these medicines.        Dose/Directions    azithromycin 250 MG tablet   Commonly known as:  ZITHROMAX   Used for:  Acute bronchitis, unspecified organism   Started by:  Brisa Whiteside PA-C        Two tablets first day, then one tablet daily for four days.   Quantity:  6 tablet   Refills:  0       benzonatate 100 MG capsule   Commonly known as:  TESSALON   Used for:  Acute bronchitis, unspecified organism   Started by:  Brisa Whiteside PA-C        Dose:  100 mg   Take 1 capsule (100 mg) by mouth 3 times daily as needed   Quantity:  30 capsule   Refills:  0       guaiFENesin-codeine 100-10 MG/5ML Soln solution   Commonly known as:  ROBITUSSIN AC   Used for:  Acute bronchitis, unspecified organism   Started by:  Brisa Whiteside PA-C        Dose:  1-2 tsp.   Take 5-10 mLs by mouth every 4 hours as needed for cough   Quantity:  236 mL   Refills:  0            Where to get your medicines      These medications were sent to [id number]  [place] - [address]  [facility], [place] [address]     Phone:  [phone]     azithromycin 250 MG tablet    benzonatate 100 MG capsule         Some of these will need a paper prescription and others can be bought over the counter.  Ask your nurse if you have questions.     Bring a paper prescription for each of these medications     guaiFENesin-codeine 100-10 MG/5ML Soln solution       "          Primary Care Provider Office Phone # Fax #    Henny Gonzalez [phone] [address] [address]        Equal Access to Services     LONDON COOPER : Jeanette shirley chappell amauryo Soomaali, waaxda luqadaha, qaybta kaalmada adeegyada, mike justinruthann bonilla. So [facility] [phone].    ATENCIÓN: Si habla español, tiene a pizano disposición servicios gratuitos de asistencia lingüística. Llame al [phone].    We comply with applicable federal civil rights laws and [place] laws. We do not discriminate on the basis of race, color, national origin, age, disability, sex, sexual orientation, or gender identity.            Thank you!     Thank you for choosing [facility]  for your care. Our goal is always to provide you with excellent care. Hearing back from our patients is one way we can continue to improve our services. Please take a few minutes to complete the written survey that you may receive in the mail after your visit with us. Thank you!             Your Updated Medication List - Protect others around you: Learn how to safely use, store and throw away your medicines at www.disposemymeds.org.          This list is accurate as of 4/23/18 11:59 PM.  Always use your most recent med list.                   Brand Name Dispense Instructions for use Diagnosis    azithromycin 250 MG tablet    ZITHROMAX    6 tablet    Two tablets first day, then one tablet daily for four days.    Acute bronchitis, unspecified organism       benzonatate 100 MG capsule    TESSALON    30 capsule    Take 1 capsule (100 mg) by mouth 3 times daily as needed    Acute bronchitis, unspecified organism       guaiFENesin-codeine 100-10 MG/5ML Soln solution    ROBITUSSIN AC    236 mL    Take 5-10 mLs by mouth every 4 hours as needed for cough    Acute bronchitis, unspecified organism       WELLBUTRIN  MG 12 hr tablet   Generic drug:  buPROPion     180  tablet    Take 1 tablet (150 mg) by mouth 2 times daily    Moderate episode of recurrent major depressive disorder (H)

## 2018-04-30 DIAGNOSIS — J20.9 ACUTE BRONCHITIS, UNSPECIFIED ORGANISM: ICD-10-CM

## 2018-04-30 RX ORDER — CODEINE PHOSPHATE AND GUAIFENESIN 10; 100 MG/5ML; MG/5ML
1-2 SOLUTION ORAL EVERY 4 HOURS PRN
Qty: 236 ML | Refills: 0 | Status: SHIPPED | OUTPATIENT
Start: 2018-04-30 | End: 2018-07-23

## 2018-04-30 NOTE — TELEPHONE ENCOUNTER
Shaquille Roberts is requesting a refill of:    Pending Prescriptions:                       Disp   Refills    guaiFENesin-codeine (ROBITUSSIN AC) 100-1*236 mL 0            Sig: Take 5-10 mLs by mouth every 4 hours as needed           for cough    Please close encounter if RX was sent. Thanks, Alicia    Please advise

## 2018-05-08 DIAGNOSIS — F33.1 MODERATE EPISODE OF RECURRENT MAJOR DEPRESSIVE DISORDER (H): ICD-10-CM

## 2018-05-08 RX ORDER — BUPROPION HYDROCHLORIDE 150 MG/1
TABLET, EXTENDED RELEASE ORAL
Qty: 60 TABLET | Refills: 0 | COMMUNITY
Start: 2018-05-08 | End: 2018-06-07

## 2018-06-06 DIAGNOSIS — F33.1 MODERATE EPISODE OF RECURRENT MAJOR DEPRESSIVE DISORDER (H): ICD-10-CM

## 2018-06-07 DIAGNOSIS — F33.1 MODERATE EPISODE OF RECURRENT MAJOR DEPRESSIVE DISORDER (H): ICD-10-CM

## 2018-06-07 RX ORDER — BUPROPION HYDROCHLORIDE 150 MG/1
TABLET, EXTENDED RELEASE ORAL
Qty: 60 TABLET | Refills: 0 | OUTPATIENT
Start: 2018-06-07

## 2018-06-07 NOTE — TELEPHONE ENCOUNTER
Pending Prescriptions:                       Disp   Refills    buPROPion (WELLBUTRIN SR) 150 MG 12 hr ta*60 tab*0            Sig: TAKE 1 TABLET BY MOUTH TWICE DAILY    BJS please review:    We gave 30 on 5-8-2018  Pt was notified of needing an  appt  No appt as of now  Please fax another 30 days, and send to [facility]  OR DENY  Thank you  Sherin  [phone] (home)

## 2018-06-07 NOTE — TELEPHONE ENCOUNTER
Refused Prescriptions:                       Disp   Refills    buPROPion (WELLBUTRIN SR) 150 MG 12 hr tab*60 tab*0        Sig: TAKE 1 TABLET BY MOUTH TWICE DAILY  Refused By: GALLITO VALDIVIA  Reason for Refusal: Duplicate    Sherin  [phone] (home)

## 2018-06-08 RX ORDER — BUPROPION HYDROCHLORIDE 150 MG/1
TABLET, EXTENDED RELEASE ORAL
Qty: 60 TABLET | Refills: 0 | Status: SHIPPED | OUTPATIENT
Start: 2018-06-08 | End: 2018-07-20

## 2018-07-20 DIAGNOSIS — F33.1 MODERATE EPISODE OF RECURRENT MAJOR DEPRESSIVE DISORDER (H): ICD-10-CM

## 2018-07-20 RX ORDER — BUPROPION HYDROCHLORIDE 150 MG/1
150 TABLET, EXTENDED RELEASE ORAL 2 TIMES DAILY
Qty: 30 TABLET | Refills: 0 | COMMUNITY
Start: 2018-07-20 | End: 2018-07-23

## 2018-07-20 NOTE — TELEPHONE ENCOUNTER
Called #15 to [facility] of his bupropion as he was given a 30 day ext in June and told to make an appt.  Has appt Monday so gave a few to get him through

## 2018-07-23 ENCOUNTER — OFFICE VISIT (OUTPATIENT)
Dept: FAMILY MEDICINE | Facility: CLINIC | Age: 58
End: 2018-07-23

## 2018-07-23 VITALS
HEART RATE: 78 BPM | DIASTOLIC BLOOD PRESSURE: 78 MMHG | OXYGEN SATURATION: 96 % | BODY MASS INDEX: 31.22 KG/M2 | TEMPERATURE: 98.7 F | SYSTOLIC BLOOD PRESSURE: 134 MMHG | WEIGHT: 227 LBS

## 2018-07-23 DIAGNOSIS — R06.83 SNORING: ICD-10-CM

## 2018-07-23 DIAGNOSIS — F33.1 MAJOR DEPRESSIVE DISORDER, RECURRENT EPISODE, MODERATE (H): Primary | ICD-10-CM

## 2018-07-23 PROCEDURE — 99213 OFFICE O/P EST LOW 20 MIN: CPT | Performed by: FAMILY MEDICINE

## 2018-07-23 RX ORDER — BUPROPION HYDROCHLORIDE 150 MG/1
150 TABLET, EXTENDED RELEASE ORAL 2 TIMES DAILY
Qty: 90 TABLET | Refills: 3 | Status: SHIPPED | OUTPATIENT
Start: 2018-07-23 | End: 2018-10-05

## 2018-07-23 ASSESSMENT — ANXIETY QUESTIONNAIRES

## 2018-07-23 ASSESSMENT — PATIENT HEALTH QUESTIONNAIRE - PHQ9: 5. POOR APPETITE OR OVEREATING: NOT AT ALL

## 2018-07-23 NOTE — MR AVS SNAPSHOT
After Visit Summary   7/23/2018    Shaquille Roberts    MRN: [id number]           Patient Information     Date Of Birth          1960        Visit Information        Provider Department      7/23/2018 5:45 PM Henny Gonzalez MD [facility]        Today's Diagnoses     Major depressive disorder, recurrent episode, moderate (H)    -  1    Moderate episode of recurrent major depressive disorder (H)        Snoring           Follow-ups after your visit        Additional Services     SLEEP EVALUATION & MANAGEMENT REFERRAL - PEDIATRIC (AGE 2-17) -       Please be aware that coverage of these services is subject to the terms and limitations of your health insurance plan.  Call member services at your health plan with any benefit or coverage questions.      Please bring the following to your appointment:    >>   List of current medications   >>   This referral request   >>   Any documents/labs given to you for this referral    Referral is for excessive snoring       [address] [address]   [phone] - appt line   [phone] -- fax   [email or web address]                  Who to contact     If you have questions or need follow up information about today's clinic visit or your schedule please contact [facility] directly at [phone].  Normal or non-critical lab and imaging results will be communicated to you by MyChart, letter or phone within 4 business days after the clinic has received the results. If you do not hear from us within 7 days, please contact the clinic through MyChart or phone. If you have a critical or abnormal lab result, we will notify you by phone as soon as possible.  Submit refill requests through [employer] or call your pharmacy and they will forward the refill request to us. Please allow 3 business days for your refill to be completed.          Additional Information About Your  Visit        "[employer]hart Information     [email or web address] gives you secure access to your electronic health record. If you see a primary care provider, you can also send messages to your care team and make appointments. If you have questions, please call your primary care clinic.  If you do not have a primary care provider, please call [phone] and they will assist you.        Care EveryWhere ID     This is your Care EveryWhere ID. This could be used by other organizations to access your [place] medical records  [id number]        Your Vitals Were     Pulse Temperature Pulse Oximetry BMI (Body Mass Index)          78 98.7  F (37.1  C) (Oral) 96% 31.22 kg/m2         Blood Pressure from Last 3 Encounters:   07/23/18 134/78   04/23/18 138/86   11/21/17 138/80    Weight from Last 3 Encounters:   07/23/18 103 kg (227 lb)   04/23/18 104.6 kg (230 lb 9.6 oz)   11/21/17 100.2 kg (221 lb)              We Performed the Following     SLEEP EVALUATION & MANAGEMENT REFERRAL - PEDIATRIC (AGE 2-17) -          Where to get your medicines      These medications were sent to [id number]  [place] - [address]  [address], [place] [address]     Phone:  [phone]     buPROPion 150 MG 12 hr tablet          Primary Care Provider Office Phone # Fax #    Henny Gonzalez [phone] [address] [address]        Equal Access to Services     VANE COOPER : Hadii aad ku hadasho Soomaali, waaxda luqadaha, qaybta kaalmada marcia, mike bonilla. So [facility] [phone].    ATENCIÓN: Si habla español, tiene a pizano disposición servicios gratuitos de asistencia lingüística. Llame al [phone].    We comply with applicable federal civil rights laws and [place] laws. We do not discriminate on the basis of race, color, national origin, age, disability, sex, sexual orientation, or gender identity.             Thank you!     Thank you for choosing [facility]  for your care. Our goal is always to provide you with excellent care. Hearing back from our patients is one way we can continue to improve our services. Please take a few minutes to complete the written survey that you may receive in the mail after your visit with us. Thank you!             Your Updated Medication List - Protect others around you: Learn how to safely use, store and throw away your medicines at www.disposemymeds.org.          This list is accurate as of 7/23/18  6:31 PM.  Always use your most recent med list.                   Brand Name Dispense Instructions for use Diagnosis    buPROPion 150 MG 12 hr tablet    WELLBUTRIN SR    90 tablet    Take 1 tablet (150 mg) by mouth 2 times daily    Moderate episode of recurrent major depressive disorder (H)

## 2018-07-23 NOTE — PROGRESS NOTES
SUBJECTIVE:   Shaquille Roberts is a 57 year old male who presents to clinic today for the following health issues:    Depression Followup    Status since last visit: Stable      See PHQ-9 for current symptoms.  Other associated symptoms: focus/ attention    Complicating factors:   Significant life event:  No   Current substance abuse:  None  Anxiety or Panic symptoms:  No    PHQ-9 8/4/2017 11/10/2017 7/23/2018   Total Score 13 3 3   Q9: Suicide Ideation More than half the days Several days Not at all            Amount of exercise or physical activity: encouraged regular exercise    Problems taking medications regularly: No    Medication side effects: none    Diet: regular (no restrictions)        PROBLEMS TO ADD ON...  Loud snorer- he wonders if he has sleep apnea    Mother is starting to have dementia-  Discussed importance of regular exercise for brain health    Problem list and histories reviewed & adjusted, as indicated.  Additional history: as documented    Patient Active Problem List   Diagnosis     Other type of migraine     Health Care Home     Major depressive disorder, recurrent episode, moderate (H)     Non morbid obesity due to excess calories     ACP (advance care planning)     Past Surgical History:   Procedure Laterality Date     C ANESTH,LUMBAR DISCOGRAPHY  2003    L3L4       COLONOSCOPY  9/2016    polyps/ repeat in 5 yrs     REMOVAL OF SPERM DUCT(S)  2/04    Dr Gomez       Social History   Substance Use Topics     Smoking status: Former Smoker     Packs/day: 1.00     Years: 15.00     Types: Cigarettes     Quit date: 1/1/1999     Smokeless tobacco: Never Used     Alcohol use 1.0 oz/week     2 drink(s) per week     Family History   Problem Relation Age of Onset     Diabetes No family hx of      C.A.D. No family hx of      Cerebrovascular Disease Maternal Grandfather      in his 50s     Cancer - colorectal No family hx of      Prostate Cancer No family hx of      Alzheimer Disease No family hx of   "    Hypertension Mother          Current Outpatient Prescriptions   Medication Sig Dispense Refill     buPROPion (WELLBUTRIN SR) 150 MG 12 hr tablet Take 1 tablet (150 mg) by mouth 2 times daily 90 tablet 3       Reviewed and updated as needed this visit by clinical staff       Reviewed and updated as needed this visit by Provider         ROS:  CONSTITUTIONAL: NEGATIVE for fever, chills, change in weight  ENT/MOUTH: NEGATIVE for ear, mouth and throat problems  RESP: NEGATIVE for significant cough or SOB  CV: NEGATIVE for chest pain, palpitations or peripheral edema  GI: NEGATIVE for nausea, abdominal pain, heartburn, or change in bowel habits  MUSCULOSKELETAL: NEGATIVE for significant arthralgias or myalgia  NEURO: NEGATIVE for weakness, dizziness or paresthesias    OBJECTIVE:     /78 (BP Location: Right arm, Patient Position: Sitting, Cuff Size: Adult Large)  Pulse 78  Temp 98.7  F (37.1  C) (Oral)  Wt 103 kg (227 lb)  SpO2 96%  BMI 31.22 kg/m2  Body mass index is 31.22 kg/(m^2).   GENERAL: healthy, alert and no distress  Cor: RRR- no significant murmur  ALert and oriented times 3; Coherent speech, normal rate and volume, able to articulate, logical thoughts, able to abstract reason, no tangential thoughts  Affect is pleasant      Diagnostic Test Results:  none     ASSESSMENT/PLAN:     Problem List Items Addressed This Visit     Major depressive disorder, recurrent episode, moderate (H) - Primary    Relevant Medications    buPROPion (WELLBUTRIN SR) 150 MG 12 hr tablet      Other Visit Diagnoses     Snoring        Relevant Orders    SLEEP EVALUATION & MANAGEMENT REFERRAL - PEDIATRIC (AGE 2-17) -           BMI:   Estimated body mass index is 31.22 kg/(m^2) as calculated from the following:    Height as of 4/23/18: 1.816 m (5' 11.5\").    Weight as of this encounter: 103 kg (227 lb).   Weight management plan: Discussed healthy diet and exercise guidelines and patient will follow up in 12 months in clinic to " re-evaluate.      MEDICATIONS:  Continue current medications without change  CONSULTATION/REFERRAL to sleep specialist- sleep study  Regular exercise    Henny Gonzalez MD  [facility]             Sleep study- if neg, ent for snoring

## 2018-07-23 NOTE — NURSING NOTE
Shaquille is here for medication check and refill today.    Pre-visit Screening:  Immunizations:  up to date  Colonoscopy:  is up to date  Mammogram: NA  Asthma Action Test/Plan:  No concerns  PHQ9:  PHQ-9 given today   GAD7:  FELIBERTO-7 given today   Questioned patient about current smoking habits Pt. Quit smoking awhile ago.  Ok to leave detailed message on voice mail for today's visit only Yes, phone # [phone]

## 2018-07-24 ASSESSMENT — ANXIETY QUESTIONNAIRES: GAD7 TOTAL SCORE: 0

## 2018-07-24 ASSESSMENT — PATIENT HEALTH QUESTIONNAIRE - PHQ9: SUM OF ALL RESPONSES TO PHQ QUESTIONS 1-9: 3

## 2018-10-05 DIAGNOSIS — F33.1 MAJOR DEPRESSIVE DISORDER, RECURRENT EPISODE, MODERATE (H): ICD-10-CM

## 2018-10-05 RX ORDER — BUPROPION HYDROCHLORIDE 150 MG/1
150 TABLET, EXTENDED RELEASE ORAL 2 TIMES DAILY
Qty: 180 TABLET | Refills: 1 | Status: SHIPPED | OUTPATIENT
Start: 2018-10-05 | End: 2019-04-23

## 2018-10-05 NOTE — TELEPHONE ENCOUNTER
Patient called to say that his medication is not adding up in numbers.  He is short pills so he thinks the Rx is incorrect.  I looked and it stats that this was filled 7/23/18 for 90 tabs with three refills.  That would only give him a two month supply.  He is out of pills so did you want this refilled for a year, or two months?  He would like another refill sent to [facility] with correct tablets ordered - Please forward to Vashti as I am leaving for the day    Pending Prescriptions:                       Disp   Refills    buPROPion (WELLBUTRIN SR) 150 MG 12 hr ta*180 ta*0            Sig: Take 1 tablet (150 mg) by mouth 2 times daily

## 2018-10-22 ENCOUNTER — TRANSFERRED RECORDS (OUTPATIENT)
Dept: FAMILY MEDICINE | Facility: CLINIC | Age: 58
End: 2018-10-22

## 2018-12-06 ENCOUNTER — TRANSFERRED RECORDS (OUTPATIENT)
Dept: FAMILY MEDICINE | Facility: CLINIC | Age: 58
End: 2018-12-06

## 2019-04-23 ENCOUNTER — TELEPHONE (OUTPATIENT)
Dept: FAMILY MEDICINE | Facility: CLINIC | Age: 59
End: 2019-04-23

## 2019-04-23 DIAGNOSIS — F33.1 MAJOR DEPRESSIVE DISORDER, RECURRENT EPISODE, MODERATE (H): ICD-10-CM

## 2019-04-23 RX ORDER — BUPROPION HYDROCHLORIDE 150 MG/1
TABLET, EXTENDED RELEASE ORAL
Qty: 60 TABLET | Refills: 0 | COMMUNITY
Start: 2019-04-23 | End: 2019-05-03

## 2019-04-23 NOTE — TELEPHONE ENCOUNTER
Called a 30 day supply of bupropion to [facility].  Please call pt to schedule a 6 month med check

## 2019-05-03 ENCOUNTER — OFFICE VISIT (OUTPATIENT)
Dept: FAMILY MEDICINE | Facility: CLINIC | Age: 59
End: 2019-05-03

## 2019-05-03 VITALS
BODY MASS INDEX: 30.94 KG/M2 | HEART RATE: 81 BPM | RESPIRATION RATE: 20 BRPM | WEIGHT: 225 LBS | DIASTOLIC BLOOD PRESSURE: 78 MMHG | OXYGEN SATURATION: 97 % | SYSTOLIC BLOOD PRESSURE: 138 MMHG

## 2019-05-03 DIAGNOSIS — F33.1 MAJOR DEPRESSIVE DISORDER, RECURRENT EPISODE, MODERATE (H): ICD-10-CM

## 2019-05-03 PROCEDURE — 99213 OFFICE O/P EST LOW 20 MIN: CPT | Performed by: FAMILY MEDICINE

## 2019-05-03 RX ORDER — BUPROPION HYDROCHLORIDE 150 MG/1
TABLET, EXTENDED RELEASE ORAL
Qty: 180 TABLET | Refills: 3 | Status: SHIPPED | OUTPATIENT
Start: 2019-05-03 | End: 2019-12-09

## 2019-05-03 ASSESSMENT — ANXIETY QUESTIONNAIRES
6. BECOMING EASILY ANNOYED OR IRRITABLE: NOT AT ALL
7. FEELING AFRAID AS IF SOMETHING AWFUL MIGHT HAPPEN: MORE THAN HALF THE DAYS
3. WORRYING TOO MUCH ABOUT DIFFERENT THINGS: SEVERAL DAYS
1. FEELING NERVOUS, ANXIOUS, OR ON EDGE: SEVERAL DAYS
IF YOU CHECKED OFF ANY PROBLEMS ON THIS QUESTIONNAIRE, HOW DIFFICULT HAVE THESE PROBLEMS MADE IT FOR YOU TO DO YOUR WORK, TAKE CARE OF THINGS AT HOME, OR GET ALONG WITH OTHER PEOPLE: SOMEWHAT DIFFICULT
5. BEING SO RESTLESS THAT IT IS HARD TO SIT STILL: NOT AT ALL
GAD7 TOTAL SCORE: 5
2. NOT BEING ABLE TO STOP OR CONTROL WORRYING: SEVERAL DAYS

## 2019-05-03 ASSESSMENT — PATIENT HEALTH QUESTIONNAIRE - PHQ9
SUM OF ALL RESPONSES TO PHQ QUESTIONS 1-9: 4
5. POOR APPETITE OR OVEREATING: NOT AT ALL

## 2019-05-03 NOTE — PROGRESS NOTES
SUBJECTIVE:   Shaquille Roberts is a 58 year old male who presents to clinic today for the following   health issues:       Depression Followup    Status since last visit: Stable      See PHQ-9 for current symptoms.  Other associated symptoms: attention difficulties    Complicating factors:   Significant life event:  Yes-  Son is hospitalized in [place]- his wife is currently staying in [place] with him   Current substance abuse:  No- reduced his alcohol intake  Anxiety or Panic symptoms:  No  Yes- under more stress-  Worried about his relationship with his wife- encouraged seeing a therapist      PHQ 2017 11/10/2017 2018   PHQ-9 Total Score 13 3 3   Q9: Thoughts of better off dead/self-harm past 2 weeks More than half the days Several days Not at all          Amount of exercise or physical activity: encouraged regular exercise      Problems taking medications regularly: No    Medication side effects: none    Diet: regular (no restrictions)        PROBLEMS TO ADD ON...  Obesity  Wants to loose weight  Discussed myfitnesspal ap-  Increase his activity  Monitor diet    Additional history: as documented    Reviewed  and updated as needed this visit by clinical staff         Reviewed and updated as needed this visit by Provider         Patient Active Problem List   Diagnosis     Other type of migraine     Health Care Home     Major depressive disorder, recurrent episode, moderate (H)     Non morbid obesity due to excess calories     ACP (advance care planning)     Past Surgical History:   Procedure Laterality Date     C ANESTH,LUMBAR DISCOGRAPHY      L3L4       COLONOSCOPY  2016    polyps/ repeat in 5 yrs     REMOVAL OF SPERM DUCT(S)      Dr Gomez       Social History     Tobacco Use     Smoking status: Former Smoker     Packs/day: 1.00     Years: 15.00     Pack years: 15.00     Types: Cigarettes     Last attempt to quit: 1999     Years since quittin.3     Smokeless tobacco: Never Used  "  Substance Use Topics     Alcohol use: Yes     Alcohol/week: 1.0 oz     Types: 2 drink(s) per week     Family History   Problem Relation Age of Onset     Diabetes No family hx of      C.A.D. No family hx of      Cerebrovascular Disease Maternal Grandfather         in his 50s     Cancer - colorectal No family hx of      Prostate Cancer No family hx of      Alzheimer Disease No family hx of      Hypertension Mother          Current Outpatient Medications   Medication Sig Dispense Refill     buPROPion (WELLBUTRIN SR) 150 MG 12 hr tablet TAKE 1 TABLET(150 MG) BY MOUTH TWICE DAILY 180 tablet 3       ROS:  Constitutional, HEENT, cardiovascular, pulmonary, gi and gu systems are negative, except as otherwise noted.    OBJECTIVE:     /78 (BP Location: Right arm, Patient Position: Sitting, Cuff Size: Adult Regular)   Pulse 81   Resp 20   Wt 102.1 kg (225 lb)   SpO2 97%   BMI 30.94 kg/m    Body mass index is 30.94 kg/m .   ALert and oriented times 3; Coherent speech, normal rate and volume, able to articulate, logical thoughts, able to abstract reason, no tangential thoughts, no hallucinations or delusions. Affect is pleasant      Diagnostic Test Results:  none     ASSESSMENT/PLAN:     Problem List Items Addressed This Visit     Major depressive disorder, recurrent episode, moderate (H)    Relevant Medications    buPROPion (WELLBUTRIN SR) 150 MG 12 hr tablet           BMI:   Estimated body mass index is 30.94 kg/m  as calculated from the following:    Height as of 4/23/18: 1.816 m (5' 11.5\").    Weight as of this encounter: 102.1 kg (225 lb).   Weight management plan: Discussed healthy diet and exercise guidelines      MEDICATIONS:  Continue current medications without change  FUTURE APPOINTMENTS:       - Follow-up visit in one year    Henny Gonzalez MD  [facility]        "

## 2019-05-03 NOTE — NURSING NOTE
Patient is here for medication check today.    Pre-visit Screening:  Immunizations:  up to date  Colonoscopy:  is up to date  Mammogram: NA  Asthma Action Test/Plan:  NA  PHQ9:  Given today   GAD7:  Given today   Questioned patient about current smoking habits Pt. quit smoking some time ago.  Ok to leave detailed message on voice mail for today's visit only Yes, phone # [phone]

## 2019-05-04 ASSESSMENT — ANXIETY QUESTIONNAIRES: GAD7 TOTAL SCORE: 5

## 2019-08-27 ENCOUNTER — TELEPHONE (OUTPATIENT)
Dept: FAMILY MEDICINE | Facility: CLINIC | Age: 59
End: 2019-08-27

## 2019-08-27 NOTE — TELEPHONE ENCOUNTER
Shaquille called and said that he would like to stop taking his Wellbutrin 150mg tablet. He takes one tablet twice per day. He is wondering what the best way to do this is.  Can he stop taking it all at once or does he need to joanna the medication? Okay to LM when you call him back.     Patients call back number: [phone]

## 2019-08-28 NOTE — TELEPHONE ENCOUNTER
Ideally, I'd like to see him in the office to discuss his decision to stop medication    But I recommend decreasing his dose to just one tablet in the morning for the next month and then discontinue

## 2019-08-28 NOTE — TELEPHONE ENCOUNTER
I called patient to inform him of the message. Patient stated that he is not sure when he is able to come in but he will call back to schedule an appointment to further discuss.

## 2019-09-27 ENCOUNTER — HEALTH MAINTENANCE LETTER (OUTPATIENT)
Age: 59
End: 2019-09-27

## 2019-12-09 ENCOUNTER — OFFICE VISIT (OUTPATIENT)
Dept: FAMILY MEDICINE | Facility: CLINIC | Age: 59
End: 2019-12-09

## 2019-12-09 VITALS
BODY MASS INDEX: 31.08 KG/M2 | WEIGHT: 226 LBS | TEMPERATURE: 99.1 F | HEART RATE: 99 BPM | SYSTOLIC BLOOD PRESSURE: 138 MMHG | DIASTOLIC BLOOD PRESSURE: 78 MMHG | OXYGEN SATURATION: 96 %

## 2019-12-09 DIAGNOSIS — J01.00 ACUTE NON-RECURRENT MAXILLARY SINUSITIS: Primary | ICD-10-CM

## 2019-12-09 PROCEDURE — 99213 OFFICE O/P EST LOW 20 MIN: CPT | Performed by: PHYSICIAN ASSISTANT

## 2019-12-10 NOTE — PROGRESS NOTES
CC: Cough    History:  Shaquille has been having sinus head pain for over 1 week. Pain is over cheeks, and behind eyes. Is having a runny nose, nasal drainage, and intermittent cough. Is getting fever, sweats, chills, and can tell that hearing is less from right ear. Has been taking out in the aisle cough suppressants and decongestants, without any effect. No significant history of sinus infections.     PMH, MEDICATIONS, ALLERGIES, SOCIAL AND FAMILY HISTORY in [facility] and reviewed by me personally.    ROS negative other than the symptoms noted above in the HPI.      Examination   /78 (BP Location: Left arm, Patient Position: Sitting, Cuff Size: Adult Large)   Pulse 99   Temp 99.1  F (37.3  C) (Oral)   Wt 102.5 kg (226 lb)   SpO2 96%   BMI 31.08 kg/m       Constitutional: Sitting comfortably, in no acute distress. Vital signs noted  Eyes: pupils equal round reactive to light and accomodation, extra ocular movements intact  Ears: external canals both obstructed by cerumen.  Nose: patent, without mucosal abnormalities  Mouth and throat: without erythema or lesions of the mucosa  Neck:  no adenopathy, trachea midline and normal to palpation, thyroid normal to palpation  Cardiovascular:  regular rate and rhythm, no murmurs, clicks, or gallops  Respiratory:  normal respiratory rate and rhythm, lungs clear to auscultation  SKIN: No jaundice/pallor/rash.   Psychiatric: mentation appears normal and affect normal/bright        A/P    ICD-10-CM    1. Acute non-recurrent maxillary sinusitis J01.00 amoxicillin-clavulanate (AUGMENTIN) 875-125 MG tablet       DISCUSSION:  Given duration of symptoms, recommended Shaquille complete 10 day course of Augmentin. He should take this twice daily with food, and should consider taking probiotic or eating yogurt in between. Warned him of possible side effects including loose stool.  Provided him with recommendations on OTC therapies based on symptoms. He should contact me in 1 week if  symptoms are not improving, or sooner with any intolerable side effects or worsening symptoms.    Did recommended bilateral ear wash, but accidentally left before completion.     follow up visit: As needed    [facility]

## 2019-12-10 NOTE — NURSING NOTE
Shaquille is here for cough and sinus pain for over a week as well as dizziness spells.          Pre-visit Screening:  Immunizations:  up to date  Colonoscopy:  is up to date  Mammogram: NA  Asthma Action Test/Plan:  NA  PHQ9:  None  GAD7:  None  Questioned patient about current smoking habits Pt. Quit some time ago  Ok to leave detailed message on voice mail for today's visit only Yes, phone # [phone]

## 2019-12-16 ENCOUNTER — OFFICE VISIT (OUTPATIENT)
Dept: FAMILY MEDICINE | Facility: CLINIC | Age: 59
End: 2019-12-16

## 2019-12-16 VITALS
BODY MASS INDEX: 31.19 KG/M2 | SYSTOLIC BLOOD PRESSURE: 130 MMHG | WEIGHT: 226.8 LBS | TEMPERATURE: 99 F | OXYGEN SATURATION: 95 % | DIASTOLIC BLOOD PRESSURE: 88 MMHG | HEART RATE: 78 BPM

## 2019-12-16 DIAGNOSIS — F33.9 EPISODE OF RECURRENT MAJOR DEPRESSIVE DISORDER, UNSPECIFIED DEPRESSION EPISODE SEVERITY (H): Primary | ICD-10-CM

## 2019-12-16 DIAGNOSIS — J98.4 CALCIFIED GRANULOMA OF LUNG: ICD-10-CM

## 2019-12-16 DIAGNOSIS — R41.840 INATTENTION: ICD-10-CM

## 2019-12-16 LAB — ERYTHROCYTE [SEDIMENTATION RATE] IN BLOOD: 14 MM/HR (ref 0–20)

## 2019-12-16 PROCEDURE — 71046 X-RAY EXAM CHEST 2 VIEWS: CPT | Performed by: FAMILY MEDICINE

## 2019-12-16 PROCEDURE — 85651 RBC SED RATE NONAUTOMATED: CPT | Performed by: FAMILY MEDICINE

## 2019-12-16 PROCEDURE — 99214 OFFICE O/P EST MOD 30 MIN: CPT | Performed by: FAMILY MEDICINE

## 2019-12-16 PROCEDURE — 36415 COLL VENOUS BLD VENIPUNCTURE: CPT | Performed by: FAMILY MEDICINE

## 2019-12-16 RX ORDER — HYDROCODONE BITARTRATE AND ACETAMINOPHEN 5; 325 MG/1; MG/1
TABLET ORAL
Refills: 0 | COMMUNITY
Start: 2019-12-13 | End: 2019-12-23

## 2019-12-16 NOTE — PROGRESS NOTES
"SUBJECTIVE:  59 year old male presents with the following concern:    He stopped wellbutrin the end of September  Feels differently  less irritable than when on the medication- Less of a temper.  He has a better relationship with his [place].  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  Admits to depression during his teenage years-     Other concerns:  Possible incidental lung nodule seen on his shoulder x-ray  Under the care of [facility]- recommended to have a chest x-ray  He denies fever, cough, shortness of breath.  Ordered today     Chest x-ray result 2010:Nodule right lower lobe consistent with calcified  granuloma. Calcifications in the right hilar lymph  nodes. Otherwise normal.    Patient Active Problem List   Diagnosis     Other type of migraine     Health Care Home     Major depressive disorder, recurrent episode, moderate (H)     Non morbid obesity due to excess calories     ACP (advance care planning)     Past Surgical History:   Procedure Laterality Date     C ANESTH,LUMBAR DISCOGRAPHY  2003    L3L4       COLONOSCOPY  9/2016    polyps/ repeat in 5 yrs     REMOVAL OF SPERM DUCT(S)  2/04    Dr Gomez     Current Outpatient Medications   Medication     amoxicillin-clavulanate (AUGMENTIN) 875-125 MG tablet     HYDROcodone-acetaminophen (NORCO) 5-325 MG tablet     No current facility-administered medications for this visit.      Family history:  Father had depression     ROS: 10 point ROS neg other than the symptoms noted above in the HPI.    OBJECTIVE:  /88 (BP Location: " Left arm, Patient Position: Sitting, Cuff Size: Adult Large)   Pulse 78   Temp 99  F (37.2  C) (Oral)   Wt 102.9 kg (226 lb 12.8 oz)   SpO2 95%   BMI 31.19 kg/m    No acute distress  ALert and oriented times 3; Coherent speech, normal rate and volume, able to articulate, logical thoughts, able to abstract reason, no tangential thoughts  Affect is flat-    Assessment   Inattentiveness  Relationship difficulties with family  Depression  History of calcified granuloma- follow up chest x-ray    PLAN:  Psychiatry consultation and therapy recommended  I did not prescribe a new antidepressant- pending evaluation   Consider neuropsych testing    More than 50% of visit spent in counseling.  20 minute visit: Level 4 visit

## 2019-12-16 NOTE — NURSING NOTE
Chief Complaint   Patient presents with     Consult     medication discussion, right arm pain     Pre-visit Screening:  Immunizations:  up to date  Colonoscopy:  NA  Mammogram: NA  Asthma Action Test/Plan:  NA  PHQ9:  given  GAD7:  given  Questioned patient about current smoking habits Pt. quit smoking some time ago.  Ok to leave detailed message on voice mail for today's visit only yes, phone # [phone]

## 2019-12-17 LAB
TSH SERPL-ACNC: 1.83 MIU/L (ref 0.4–4.5)
VIT B12 SERPL-MCNC: 383 PG/ML (ref 200–1100)

## 2019-12-23 ENCOUNTER — OFFICE VISIT (OUTPATIENT)
Dept: FAMILY MEDICINE | Facility: CLINIC | Age: 59
End: 2019-12-23

## 2019-12-23 VITALS
BODY MASS INDEX: 31.69 KG/M2 | HEART RATE: 73 BPM | WEIGHT: 226.4 LBS | HEIGHT: 71 IN | DIASTOLIC BLOOD PRESSURE: 84 MMHG | TEMPERATURE: 98.8 F | OXYGEN SATURATION: 98 % | SYSTOLIC BLOOD PRESSURE: 146 MMHG

## 2019-12-23 DIAGNOSIS — S46.011D TRAUMATIC COMPLETE TEAR OF RIGHT ROTATOR CUFF, SUBSEQUENT ENCOUNTER: ICD-10-CM

## 2019-12-23 DIAGNOSIS — E66.811 CLASS 1 OBESITY WITHOUT SERIOUS COMORBIDITY WITH BODY MASS INDEX (BMI) OF 30.0 TO 30.9 IN ADULT, UNSPECIFIED OBESITY TYPE: ICD-10-CM

## 2019-12-23 DIAGNOSIS — Z01.818 PRE-OPERATIVE EXAMINATION: Primary | ICD-10-CM

## 2019-12-23 DIAGNOSIS — E78.00 ELEVATED CHOLESTEROL: ICD-10-CM

## 2019-12-23 DIAGNOSIS — F33.1 MAJOR DEPRESSIVE DISORDER, RECURRENT EPISODE, MODERATE (H): ICD-10-CM

## 2019-12-23 PROBLEM — S46.011A TRAUMATIC COMPLETE TEAR OF RIGHT ROTATOR CUFF: Status: ACTIVE | Noted: 2019-12-23

## 2019-12-23 LAB
ALBUMIN SERPL-MCNC: 4.7 G/DL (ref 3.6–5.1)
ALBUMIN/GLOB SERPL: 1.8 {RATIO} (ref 1–2.5)
ALP SERPL-CCNC: 67 U/L (ref 33–130)
ALT 1742-6: 32 U/L (ref 0–32)
AST 1920-8: 16 U/L (ref 0–35)
BILIRUB SERPL-MCNC: 0.6 MG/DL (ref 0.2–1.2)
BUN SERPL-MCNC: 19 MG/DL (ref 7–25)
BUN/CREATININE RATIO: 20.4 (ref 6–22)
CALCIUM SERPL-MCNC: 9.3 MG/DL (ref 8.6–10.3)
CHLORIDE SERPLBLD-SCNC: 104.1 MMOL/L (ref 98–110)
CO2 SERPL-SCNC: 28.2 MMOL/L (ref 20–32)
CREAT SERPL-MCNC: 0.93 MG/DL (ref 0.7–1.18)
ERYTHROCYTE [DISTWIDTH] IN BLOOD BY AUTOMATED COUNT: 12.2 %
GLOBULIN, CALCULATED - QUEST: 2.6 (ref 1.9–3.7)
GLUCOSE SERPL-MCNC: 90 MG/DL (ref 60–99)
HCT VFR BLD AUTO: 46.7 % (ref 40–53)
HEMOGLOBIN: 15.3 G/DL (ref 13.3–17.7)
MCH RBC QN AUTO: 30.4 PG (ref 26–33)
MCHC RBC AUTO-ENTMCNC: 32.8 G/DL (ref 31–36)
MCV RBC AUTO: 92.7 FL (ref 78–100)
PLATELET COUNT - QUEST: 387 10^9/L (ref 150–375)
POTASSIUM SERPL-SCNC: 4.57 MMOL/L (ref 3.5–5.3)
PROT SERPL-MCNC: 7.3 G/DL (ref 6.1–8.1)
RBC # BLD AUTO: 5.04 10*12/L (ref 4.4–5.9)
SODIUM SERPL-SCNC: 141.1 MMOL/L (ref 135–146)
WBC # BLD AUTO: 4.5 10*9/L (ref 4–11)

## 2019-12-23 PROCEDURE — 80053 COMPREHEN METABOLIC PANEL: CPT | Performed by: PHYSICIAN ASSISTANT

## 2019-12-23 PROCEDURE — 85027 COMPLETE CBC AUTOMATED: CPT | Performed by: PHYSICIAN ASSISTANT

## 2019-12-23 PROCEDURE — 99214 OFFICE O/P EST MOD 30 MIN: CPT | Performed by: PHYSICIAN ASSISTANT

## 2019-12-23 PROCEDURE — 36415 COLL VENOUS BLD VENIPUNCTURE: CPT | Performed by: PHYSICIAN ASSISTANT

## 2019-12-23 PROCEDURE — 93000 ELECTROCARDIOGRAM COMPLETE: CPT | Performed by: PHYSICIAN ASSISTANT

## 2019-12-23 RX ORDER — HYDROCODONE BITARTRATE AND ACETAMINOPHEN 5; 325 MG/1; MG/1
1 TABLET ORAL EVERY 6 HOURS PRN
Qty: 10 TABLET | Refills: 0 | Status: SHIPPED | OUTPATIENT
Start: 2019-12-23 | End: 2019-12-26

## 2019-12-23 ASSESSMENT — MIFFLIN-ST. JEOR: SCORE: 1872.24

## 2019-12-23 NOTE — LETTER
[facility]YSIC[facility]  [address]  [address]  [facility] [address]  [phone]  Dept: [phone]    PRE-OP EVALUATION:  Today's date: 2019    Shaquille Roberts (: 1960) presents for pre-operative evaluation assessment as requested by Dr. Dill.  He requires evaluation and anesthesia risk assessment prior to undergoing surgery/procedure for treatment of right shoulder repair.    Proposed Surgery/ Procedure: Right rotator cuff and ligament repair  Date of Surgery/ Procedure: 2019  Time of Surgery/ Procedure: 1 pm  Hospital/Surgical Facility: [facility]  Primary Physician: Henny Gonzalez  Type of Anesthesia Anticipated: to be determined    Patient has a Health Care Directive or Living Will:  NO    1. NO - Do you have a history of heart attack, stroke, stent, bypass or surgery on an artery in the head, neck, heart or legs?  2. NO - Do you ever have any pain or discomfort in your chest?  3. NO - Do you have a history of  Heart Failure?  4. NO - Are you troubled by shortness of breath when: walking on the level, up a slight hill or at night?  5. NO - Do you currently have a cold, bronchitis or other respiratory infection?  6. NO - Do you have a cough, shortness of breath or wheezing?  7. NO - Do you sometimes get pains in the calves of your legs when you walk?  8. NO - Do you or anyone in your family have previous history of blood clots?  9. NO - Do you or does anyone in your family have a serious bleeding problem such as prolonged bleeding following surgeries or cuts?  10. NO - Have you ever had problems with anemia or been told to take iron pills?  11. NO - Have you had any abnormal blood loss such as black, tarry or bloody stools, or abnormal vaginal bleeding?  12. NO - Have you ever had a blood transfusion?  13. NO - Have you or any of your relatives ever had problems with anesthesia?  14. NO - Do you have sleep apnea, excessive snoring or daytime  drowsiness?  15. NO - Do you have any prosthetic heart valves?  16. NO - Do you have prosthetic joints?  17. NO - Is there any chance that you may be pregnant?      HPI:     HPI related to upcoming procedure: Shaquille fell 12/12/19 and injured right shoulder. Went to orthopedic urgent care who did MRI that night. Showed that he tore entire rotator cuff.     See problem list for active medical problems.  Problems all longstanding and stable, except as noted/documented.  See ROS for pertinent symptoms related to these conditions.      MEDICAL HISTORY:     Patient Active Problem List    Diagnosis Date Noted     Traumatic complete tear of right rotator cuff 12/23/2019     Priority: Medium     Calcified granuloma of lung (H) 12/16/2019     Priority: Medium     Non morbid obesity due to excess calories 08/03/2016     Priority: Medium     Major depressive disorder, recurrent episode, moderate (H) 01/07/2016     Priority: Medium     Health Care Home 10/23/2013     Priority: Medium     Other type of migraine 08/03/2004     Priority: Medium     Diagnosis updated by automated process. Provider to review and confirm.       ACP (advance care planning) 08/04/2017     Priority: Low     Advance Care Planning 8/4/2017: ACP Review of Chart / Resources Provided:  Reviewed chart for advance care plan.  Shaquille Roberts has no plan or code status on file. Discussed available resources and provided with information.   Added by Regi Lind              Past Medical History:   Diagnosis Date     ALLERGIC RHINITIS NOS-mold 12/10/2001     MIGRAINE NEC W/O MENTN INTRACTABLE 8/3/2004     Past Surgical History:   Procedure Laterality Date     C ANESTH,LUMBAR DISCOGRAPHY  2003    L3L4       COLONOSCOPY  9/2016    polyps/ repeat in 5 yrs     REMOVAL OF SPERM DUCT(S)  2/04    Dr Gomez     Current Outpatient Medications   Medication Sig Dispense Refill     HYDROcodone-acetaminophen (NORCO) 5-325 MG tablet Take 1 tablet by mouth every 6 hours  "as needed for pain 10 tablet 0     HYDROcodone-acetaminophen (NORCO) 5-325 MG tablet TAKE ONE TO TWO TS PO Q 4 TO 6 HOURS AS NEEDED FOR PAIN  0     OTC products: Has been taking ibuprofen and Tyenol. Will hold ibuprofen starting today.    No Known Allergies     Latex Allergy: NO    Social History     Tobacco Use     Smoking status: Former Smoker     Packs/day: 1.00     Years: 15.00     Pack years: 15.00     Types: Cigarettes     Last attempt to quit: 1999     Years since quittin.9     Smokeless tobacco: Never Used   Substance Use Topics     Alcohol use: Yes     Alcohol/week: 1.7 standard drinks     Types: 2 drink(s) per week     History   Drug Use No       REVIEW OF SYSTEMS:   Constitutional, neuro, ENT, endocrine, pulmonary, cardiac, gastrointestinal, genitourinary, musculoskeletal, integument and psychiatric systems are negative, except as otherwise noted.    EXAM:   BP (!) 146/84 (BP Location: Left arm, Patient Position: Sitting, Cuff Size: Adult Large)   Pulse 73   Temp 98.8  F (37.1  C) (Oral)   Ht 1.808 m (5' 11.2\")   Wt 102.7 kg (226 lb 6.4 oz)   SpO2 98%   BMI 31.40 kg/m      GENERAL APPEARANCE: healthy, alert and no distress     EYES: EOMI,  PERRL     HENT: ear canals and TM's normal and nose and mouth without ulcers or lesions     NECK: no adenopathy, no asymmetry, masses, or scars and thyroid normal to palpation     RESP: lungs clear to auscultation - no rales, rhonchi or wheezes     CV: regular rates and rhythm, normal S1 S2, no S3 or S4 and no murmur, click or rub     ABDOMEN:  soft, nontender, no HSM or masses and bowel sounds normal     MS: extremities normal- no gross deformities noted, no evidence of inflammation in joints, FROM in all extremities.     SKIN: no suspicious lesions or rashes     NEURO: Normal strength and tone, sensory exam grossly normal, mentation intact and speech normal     PSYCH: mentation appears normal. and affect normal/bright     LYMPHATICS: No cervical " adenopathy    DIAGNOSTICS:   EKG: appears normal, NSR, normal axis, normal intervals, no acute ST/T changes c/w ischemia, no LVH by voltage criteria, unchanged from previous tracings  Hemoglobin (indicated for history of anemia or procedure with significant blood loss such as tonsillectomy, major intraperitoneal surgery, vascular surgery, major spine surgery, total joint replacement)    Office Visit on 12/23/2019   Component Date Value Ref Range Status     Carbon Dioxide 12/23/2019 28.2  20 - 32 mmol/L Final     Creatinine 12/23/2019 0.93  0.70 - 1.18 mg/dL Final     Glucose 12/23/2019 90  60 - 99 mg/dL Final     Sodium 12/23/2019 141.1  135 - 146 mmol/L Final     Potassium 12/23/2019 4.57  3.5 - 5.3 mmol/L Final     Chloride 12/23/2019 104.1  98 - 110 mmol/L Final     Protein Total 12/23/2019 7.3  6.1 - 8.1 g/dL Final     Albumin 12/23/2019 4.7  3.6 - 5.1 g/dL Final     Alkaline Phosphatase 12/23/2019 67  33 - 130 U/L Final     ALT 12/23/2019 32  0 - 32 U/L Final     AST 12/23/2019 16  0 - 35 U/L Final     Bilirubin Total 12/23/2019 0.6  0.2 - 1.2 mg/dL Final     Urea Nitrogen 12/23/2019 19  7 - 25 mg/dL Final     Calcium 12/23/2019 9.3  8.6 - 10.3 mg/dL Final     BUN/Creatinine Ratio 12/23/2019 20.4  6 - 22 Final     Globulin Calculated 12/23/2019 2.6  1.9 - 3.7 Final     A/G Ratio 12/23/2019 1.8  1 - 2.5 Final     WBC 12/23/2019 4.5  4.0 - 11 10*9/L Final     RBC Count 12/23/2019 5.04  4.4 - 5.9 10*12/L Final     Hemoglobin 12/23/2019 15.3  13.3 - 17.7 g/dL Final     Hematocrit 12/23/2019 46.7  40.0 - 53.0 % Final     MCV 12/23/2019 92.7  78 - 100 fL Final     MCH 12/23/2019 30.4  26 - 33 pg Final     MCHC 12/23/2019 32.8  31 - 36 g/dL Final     RDW 12/23/2019 12.2  % Final     Platelet Count 12/23/2019 387* 150 - 375 10^9/L Final     No concerns with lab findings.    IMPRESSION:   Reason for surgery/procedure: Right rotator cuff tear  Diagnosis/reason for consult: Pre-operative Examination    The proposed  surgical procedure is considered INTERMEDIATE risk.    REVISED CARDIAC RISK INDEX  The patient has the following serious cardiovascular risks for perioperative complications such as (MI, PE, VFib and 3  AV Block):  No serious cardiac risks  INTERPRETATION: 0 risks: Class I (very low risk - 0.4% complication rate)    The patient has the following additional risks for perioperative complications:  No identified additional risks      ICD-10-CM    1. Pre-operative examination Z01.818 VENOUS COLLECTION     Comprehensive Metobolic Panel (BFP)     HEMOGRAM/PLATELET (BFP)     EKG 12-lead complete w/read - Clinics   2. Traumatic complete tear of right rotator cuff, subsequent encounter S46.011D VENOUS COLLECTION     Comprehensive Metobolic Panel (BFP)     HEMOGRAM/PLATELET (BFP)     EKG 12-lead complete w/read - Clinics     HYDROcodone-acetaminophen (NORCO) 5-325 MG tablet   3. Elevated cholesterol E78.00    4. Class 1 obesity without serious comorbidity with body mass index (BMI) of 30.0 to 30.9 in adult, unspecified obesity type E66.9     Z68.30    5. Major depressive disorder, recurrent episode, moderate (H) F33.1        RECOMMENDATIONS:     --Patient is to take all scheduled medications on the day of surgery EXCEPT for modifications listed below.    APPROVAL GIVEN to proceed with proposed procedure, without further diagnostic evaluation    1. Seven days before surgery do not take Aspirin or any over-the-counter pain medications other than Tylenol.  TYLENOL is the safest pain pill to use before surgery because it does not affect your bleeding time. If tylenol is not sufficient for pain control talk to me or the surgeon and we will decide what is safe to use.    2. Do not eat anything after midnight  (lima of the surgery) and nothing the morning of the surgery.    3. Medications: Does not take any daily medication. Stopping ibuprofen today 12/23/2019, so filled Norco/Vicodin for 10 tablets, to get through to surgery, but  advised pt that after that needs to come from ortho. Warned of no driving/drowsiness, addictive potential, and to use only if absolutely needed. Otherwise use Tylenol.    4. Follow all instructions given by the surgery team. They usually give out a packet. Read it and please follow it precisely. This helps surgical experience and outcomes.    5. If you have any questions do not hesitate to call me or the surgeon/surgical team.      Brisa Whiteside PA-C  [facility]          Signed Electronically by: Brisa Whiteside PA-C    Copy of this evaluation report is provided to requesting physician.    [facility] Preop Guidelines    Revised Cardiac Risk Index

## 2019-12-23 NOTE — PROGRESS NOTES
[facility]IC[facility]ANS  [address]  [address]  [facility] [address]  [phone]  Dept: [phone]    PRE-OP EVALUATION:  Today's date: 2019    Shaquille Roberts (: 1960) presents for pre-operative evaluation assessment as requested by Dr. Dill.  He requires evaluation and anesthesia risk assessment prior to undergoing surgery/procedure for treatment of right shoulder repair.    Proposed Surgery/ Procedure: Right rotator cuff and ligament repair  Date of Surgery/ Procedure: 2019  Time of Surgery/ Procedure: 1 pm  Hospital/Surgical Facility: [facility]  Primary Physician: Henny Gonzalez  Type of Anesthesia Anticipated: to be determined    Patient has a Health Care Directive or Living Will:  NO    1. NO - Do you have a history of heart attack, stroke, stent, bypass or surgery on an artery in the head, neck, heart or legs?  2. NO - Do you ever have any pain or discomfort in your chest?  3. NO - Do you have a history of  Heart Failure?  4. NO - Are you troubled by shortness of breath when: walking on the level, up a slight hill or at night?  5. NO - Do you currently have a cold, bronchitis or other respiratory infection?  6. NO - Do you have a cough, shortness of breath or wheezing?  7. NO - Do you sometimes get pains in the calves of your legs when you walk?  8. NO - Do you or anyone in your family have previous history of blood clots?  9. NO - Do you or does anyone in your family have a serious bleeding problem such as prolonged bleeding following surgeries or cuts?  10. NO - Have you ever had problems with anemia or been told to take iron pills?  11. NO - Have you had any abnormal blood loss such as black, tarry or bloody stools, or abnormal vaginal bleeding?  12. NO - Have you ever had a blood transfusion?  13. NO - Have you or any of your relatives ever had problems with anesthesia?  14. NO - Do you have sleep apnea, excessive snoring or daytime  drowsiness?  15. NO - Do you have any prosthetic heart valves?  16. NO - Do you have prosthetic joints?  17. NO - Is there any chance that you may be pregnant?      HPI:     HPI related to upcoming procedure: Shaquille fell 12/12/19 and injured right shoulder. Went to orthopedic urgent care who did MRI that night. Showed that he tore entire rotator cuff.     See problem list for active medical problems.  Problems all longstanding and stable, except as noted/documented.  See ROS for pertinent symptoms related to these conditions.      MEDICAL HISTORY:     Patient Active Problem List    Diagnosis Date Noted     Traumatic complete tear of right rotator cuff 12/23/2019     Priority: Medium     Calcified granuloma of lung (H) 12/16/2019     Priority: Medium     Non morbid obesity due to excess calories 08/03/2016     Priority: Medium     Major depressive disorder, recurrent episode, moderate (H) 01/07/2016     Priority: Medium     Health Care Home 10/23/2013     Priority: Medium     Other type of migraine 08/03/2004     Priority: Medium     Diagnosis updated by automated process. Provider to review and confirm.       ACP (advance care planning) 08/04/2017     Priority: Low     Advance Care Planning 8/4/2017: ACP Review of Chart / Resources Provided:  Reviewed chart for advance care plan.  Shaquille Roberts has no plan or code status on file. Discussed available resources and provided with information.   Added by Regi Lind              Past Medical History:   Diagnosis Date     ALLERGIC RHINITIS NOS-mold 12/10/2001     MIGRAINE NEC W/O MENTN INTRACTABLE 8/3/2004     Past Surgical History:   Procedure Laterality Date     C ANESTH,LUMBAR DISCOGRAPHY  2003    L3L4       COLONOSCOPY  9/2016    polyps/ repeat in 5 yrs     REMOVAL OF SPERM DUCT(S)  2/04    Dr Gomez     Current Outpatient Medications   Medication Sig Dispense Refill     HYDROcodone-acetaminophen (NORCO) 5-325 MG tablet Take 1 tablet by mouth every 6 hours  "as needed for pain 10 tablet 0     HYDROcodone-acetaminophen (NORCO) 5-325 MG tablet TAKE ONE TO TWO TS PO Q 4 TO 6 HOURS AS NEEDED FOR PAIN  0     OTC products: Has been taking ibuprofen and Tyenol. Will hold ibuprofen starting today.    No Known Allergies     Latex Allergy: NO    Social History     Tobacco Use     Smoking status: Former Smoker     Packs/day: 1.00     Years: 15.00     Pack years: 15.00     Types: Cigarettes     Last attempt to quit: 1999     Years since quittin.9     Smokeless tobacco: Never Used   Substance Use Topics     Alcohol use: Yes     Alcohol/week: 1.7 standard drinks     Types: 2 drink(s) per week     History   Drug Use No       REVIEW OF SYSTEMS:   Constitutional, neuro, ENT, endocrine, pulmonary, cardiac, gastrointestinal, genitourinary, musculoskeletal, integument and psychiatric systems are negative, except as otherwise noted.    EXAM:   BP (!) 146/84 (BP Location: Left arm, Patient Position: Sitting, Cuff Size: Adult Large)   Pulse 73   Temp 98.8  F (37.1  C) (Oral)   Ht 1.808 m (5' 11.2\")   Wt 102.7 kg (226 lb 6.4 oz)   SpO2 98%   BMI 31.40 kg/m      GENERAL APPEARANCE: healthy, alert and no distress     EYES: EOMI,  PERRL     HENT: ear canals and TM's normal and nose and mouth without ulcers or lesions     NECK: no adenopathy, no asymmetry, masses, or scars and thyroid normal to palpation     RESP: lungs clear to auscultation - no rales, rhonchi or wheezes     CV: regular rates and rhythm, normal S1 S2, no S3 or S4 and no murmur, click or rub     ABDOMEN:  soft, nontender, no HSM or masses and bowel sounds normal     MS: extremities normal- no gross deformities noted, no evidence of inflammation in joints, FROM in all extremities.     SKIN: no suspicious lesions or rashes     NEURO: Normal strength and tone, sensory exam grossly normal, mentation intact and speech normal     PSYCH: mentation appears normal. and affect normal/bright     LYMPHATICS: No cervical " adenopathy    DIAGNOSTICS:   EKG: appears normal, NSR, normal axis, normal intervals, no acute ST/T changes c/w ischemia, no LVH by voltage criteria, unchanged from previous tracings  Hemoglobin (indicated for history of anemia or procedure with significant blood loss such as tonsillectomy, major intraperitoneal surgery, vascular surgery, major spine surgery, total joint replacement)    Office Visit on 12/23/2019   Component Date Value Ref Range Status     Carbon Dioxide 12/23/2019 28.2  20 - 32 mmol/L Final     Creatinine 12/23/2019 0.93  0.70 - 1.18 mg/dL Final     Glucose 12/23/2019 90  60 - 99 mg/dL Final     Sodium 12/23/2019 141.1  135 - 146 mmol/L Final     Potassium 12/23/2019 4.57  3.5 - 5.3 mmol/L Final     Chloride 12/23/2019 104.1  98 - 110 mmol/L Final     Protein Total 12/23/2019 7.3  6.1 - 8.1 g/dL Final     Albumin 12/23/2019 4.7  3.6 - 5.1 g/dL Final     Alkaline Phosphatase 12/23/2019 67  33 - 130 U/L Final     ALT 12/23/2019 32  0 - 32 U/L Final     AST 12/23/2019 16  0 - 35 U/L Final     Bilirubin Total 12/23/2019 0.6  0.2 - 1.2 mg/dL Final     Urea Nitrogen 12/23/2019 19  7 - 25 mg/dL Final     Calcium 12/23/2019 9.3  8.6 - 10.3 mg/dL Final     BUN/Creatinine Ratio 12/23/2019 20.4  6 - 22 Final     Globulin Calculated 12/23/2019 2.6  1.9 - 3.7 Final     A/G Ratio 12/23/2019 1.8  1 - 2.5 Final     WBC 12/23/2019 4.5  4.0 - 11 10*9/L Final     RBC Count 12/23/2019 5.04  4.4 - 5.9 10*12/L Final     Hemoglobin 12/23/2019 15.3  13.3 - 17.7 g/dL Final     Hematocrit 12/23/2019 46.7  40.0 - 53.0 % Final     MCV 12/23/2019 92.7  78 - 100 fL Final     MCH 12/23/2019 30.4  26 - 33 pg Final     MCHC 12/23/2019 32.8  31 - 36 g/dL Final     RDW 12/23/2019 12.2  % Final     Platelet Count 12/23/2019 387* 150 - 375 10^9/L Final     No concerns with lab findings.    IMPRESSION:   Reason for surgery/procedure: Right rotator cuff tear  Diagnosis/reason for consult: Pre-operative Examination    The proposed  surgical procedure is considered INTERMEDIATE risk.    REVISED CARDIAC RISK INDEX  The patient has the following serious cardiovascular risks for perioperative complications such as (MI, PE, VFib and 3  AV Block):  No serious cardiac risks  INTERPRETATION: 0 risks: Class I (very low risk - 0.4% complication rate)    The patient has the following additional risks for perioperative complications:  No identified additional risks      ICD-10-CM    1. Pre-operative examination Z01.818 VENOUS COLLECTION     Comprehensive Metobolic Panel (BFP)     HEMOGRAM/PLATELET (BFP)     EKG 12-lead complete w/read - Clinics   2. Traumatic complete tear of right rotator cuff, subsequent encounter S46.011D VENOUS COLLECTION     Comprehensive Metobolic Panel (BFP)     HEMOGRAM/PLATELET (BFP)     EKG 12-lead complete w/read - Clinics     HYDROcodone-acetaminophen (NORCO) 5-325 MG tablet   3. Elevated cholesterol E78.00    4. Class 1 obesity without serious comorbidity with body mass index (BMI) of 30.0 to 30.9 in adult, unspecified obesity type E66.9     Z68.30    5. Major depressive disorder, recurrent episode, moderate (H) F33.1        RECOMMENDATIONS:     --Patient is to take all scheduled medications on the day of surgery EXCEPT for modifications listed below.    APPROVAL GIVEN to proceed with proposed procedure, without further diagnostic evaluation    1. Seven days before surgery do not take Aspirin or any over-the-counter pain medications other than Tylenol.  TYLENOL is the safest pain pill to use before surgery because it does not affect your bleeding time. If tylenol is not sufficient for pain control talk to me or the surgeon and we will decide what is safe to use.    2. Do not eat anything after midnight  (lima of the surgery) and nothing the morning of the surgery.    3. Medications: Does not take any daily medication. Stopping ibuprofen today 12/23/2019, so filled Norco/Vicodin for 10 tablets, to get through to surgery, but  advised pt that after that needs to come from ortho. Warned of no driving/drowsiness, addictive potential, and to use only if absolutely needed. Otherwise use Tylenol.    4. Follow all instructions given by the surgery team. They usually give out a packet. Read it and please follow it precisely. This helps surgical experience and outcomes.    5. If you have any questions do not hesitate to call me or the surgeon/surgical team.      Brisa Whiteside PA-C  [facility]          Signed Electronically by: Brisa Whiteside PA-C    Copy of this evaluation report is provided to requesting physician.    [facility] Preop Guidelines    Revised Cardiac Risk Index

## 2020-12-09 ENCOUNTER — OFFICE VISIT (OUTPATIENT)
Dept: FAMILY MEDICINE | Facility: CLINIC | Age: 60
End: 2020-12-09

## 2020-12-09 VITALS
DIASTOLIC BLOOD PRESSURE: 76 MMHG | HEIGHT: 72 IN | OXYGEN SATURATION: 98 % | BODY MASS INDEX: 30.61 KG/M2 | TEMPERATURE: 97.7 F | SYSTOLIC BLOOD PRESSURE: 126 MMHG | HEART RATE: 70 BPM | WEIGHT: 226 LBS

## 2020-12-09 DIAGNOSIS — Z13.228 SCREENING FOR METABOLIC DISORDER: ICD-10-CM

## 2020-12-09 DIAGNOSIS — E66.09 CLASS 1 OBESITY DUE TO EXCESS CALORIES WITHOUT SERIOUS COMORBIDITY WITH BODY MASS INDEX (BMI) OF 31.0 TO 31.9 IN ADULT: ICD-10-CM

## 2020-12-09 DIAGNOSIS — Z82.0 FAMILY HISTORY OF ALZHEIMER'S DISEASE: ICD-10-CM

## 2020-12-09 DIAGNOSIS — E66.811 CLASS 1 OBESITY DUE TO EXCESS CALORIES WITHOUT SERIOUS COMORBIDITY WITH BODY MASS INDEX (BMI) OF 31.0 TO 31.9 IN ADULT: ICD-10-CM

## 2020-12-09 DIAGNOSIS — Z00.00 ENCOUNTER FOR GENERAL HEALTH EXAMINATION: Primary | ICD-10-CM

## 2020-12-09 DIAGNOSIS — E78.00 ELEVATED CHOLESTEROL: ICD-10-CM

## 2020-12-09 DIAGNOSIS — K62.5 RECTAL BLEEDING: ICD-10-CM

## 2020-12-09 DIAGNOSIS — Z13.0 SCREENING FOR BLOOD DISEASE: ICD-10-CM

## 2020-12-09 DIAGNOSIS — F33.42 MAJOR DEPRESSIVE DISORDER, RECURRENT, IN FULL REMISSION (H): ICD-10-CM

## 2020-12-09 PROBLEM — S46.011A TRAUMATIC COMPLETE TEAR OF RIGHT ROTATOR CUFF: Status: RESOLVED | Noted: 2019-12-23 | Resolved: 2020-12-09

## 2020-12-09 LAB
ALBUMIN SERPL-MCNC: 4.2 G/DL (ref 3.6–5.1)
ALBUMIN/GLOB SERPL: 1.6 {RATIO} (ref 1–2.5)
ALP SERPL-CCNC: 78 U/L (ref 33–130)
ALT 1742-6: 23 U/L (ref 0–32)
AST 1920-8: 13 U/L (ref 0–35)
BILIRUB SERPL-MCNC: 0.7 MG/DL (ref 0.2–1.2)
BUN SERPL-MCNC: 16 MG/DL (ref 7–25)
BUN/CREATININE RATIO: 16.3 (ref 6–22)
CALCIUM SERPL-MCNC: 9.6 MG/DL (ref 8.6–10.3)
CHLORIDE SERPLBLD-SCNC: 103.7 MMOL/L (ref 98–110)
CHOLEST SERPL-MCNC: 226 MG/DL (ref 0–199)
CHOLEST/HDLC SERPL: 3 {RATIO} (ref 0–5)
CO2 SERPL-SCNC: 31.2 MMOL/L (ref 20–32)
CREAT SERPL-MCNC: 0.98 MG/DL (ref 0.7–1.18)
ERYTHROCYTE [DISTWIDTH] IN BLOOD BY AUTOMATED COUNT: 11.8 %
GLOBULIN, CALCULATED - QUEST: 2.7 (ref 1.9–3.7)
GLUCOSE SERPL-MCNC: 93 MG/DL (ref 60–99)
HCT VFR BLD AUTO: 48.5 % (ref 40–53)
HDLC SERPL-MCNC: 67 MG/DL (ref 40–150)
HEMOGLOBIN: 15.8 G/DL (ref 13.3–17.7)
LDLC SERPL CALC-MCNC: 137 MG/DL (ref 0–130)
MCH RBC QN AUTO: 30.5 PG (ref 26–33)
MCHC RBC AUTO-ENTMCNC: 32.6 G/DL (ref 31–36)
MCV RBC AUTO: 93.6 FL (ref 78–100)
PLATELET COUNT - QUEST: 311 10^9/L (ref 150–375)
POTASSIUM SERPL-SCNC: 4.74 MMOL/L (ref 3.5–5.3)
PROT SERPL-MCNC: 6.9 G/DL (ref 6.1–8.1)
RBC # BLD AUTO: 5.18 10*12/L (ref 4.4–5.9)
SODIUM SERPL-SCNC: 140.8 MMOL/L (ref 135–146)
TRIGL SERPL-MCNC: 11 MG/DL (ref 0–149)
WBC # BLD AUTO: 4.8 10*9/L (ref 4–11)

## 2020-12-09 PROCEDURE — 85027 COMPLETE CBC AUTOMATED: CPT | Performed by: PHYSICIAN ASSISTANT

## 2020-12-09 PROCEDURE — 99396 PREV VISIT EST AGE 40-64: CPT | Performed by: PHYSICIAN ASSISTANT

## 2020-12-09 PROCEDURE — 80053 COMPREHEN METABOLIC PANEL: CPT | Performed by: PHYSICIAN ASSISTANT

## 2020-12-09 PROCEDURE — 80061 LIPID PANEL: CPT | Performed by: PHYSICIAN ASSISTANT

## 2020-12-09 PROCEDURE — 36415 COLL VENOUS BLD VENIPUNCTURE: CPT | Performed by: PHYSICIAN ASSISTANT

## 2020-12-09 SDOH — HEALTH STABILITY: MENTAL HEALTH: HOW OFTEN DO YOU HAVE 6 OR MORE DRINKS ON ONE OCCASION?: NOT ASKED

## 2020-12-09 SDOH — HEALTH STABILITY: MENTAL HEALTH: HOW OFTEN DO YOU HAVE A DRINK CONTAINING ALCOHOL?: NOT ASKED

## 2020-12-09 SDOH — HEALTH STABILITY: MENTAL HEALTH: HOW MANY STANDARD DRINKS CONTAINING ALCOHOL DO YOU HAVE ON A TYPICAL DAY?: NOT ASKED

## 2020-12-09 ASSESSMENT — ANXIETY QUESTIONNAIRES
6. BECOMING EASILY ANNOYED OR IRRITABLE: NOT AT ALL
GAD7 TOTAL SCORE: 1
IF YOU CHECKED OFF ANY PROBLEMS ON THIS QUESTIONNAIRE, HOW DIFFICULT HAVE THESE PROBLEMS MADE IT FOR YOU TO DO YOUR WORK, TAKE CARE OF THINGS AT HOME, OR GET ALONG WITH OTHER PEOPLE: NOT DIFFICULT AT ALL
2. NOT BEING ABLE TO STOP OR CONTROL WORRYING: NOT AT ALL
1. FEELING NERVOUS, ANXIOUS, OR ON EDGE: NOT AT ALL
3. WORRYING TOO MUCH ABOUT DIFFERENT THINGS: NOT AT ALL
7. FEELING AFRAID AS IF SOMETHING AWFUL MIGHT HAPPEN: SEVERAL DAYS
5. BEING SO RESTLESS THAT IT IS HARD TO SIT STILL: NOT AT ALL

## 2020-12-09 ASSESSMENT — MIFFLIN-ST. JEOR: SCORE: 1870.19

## 2020-12-09 ASSESSMENT — PATIENT HEALTH QUESTIONNAIRE - PHQ9
5. POOR APPETITE OR OVEREATING: NOT AT ALL
SUM OF ALL RESPONSES TO PHQ QUESTIONS 1-9: 3

## 2020-12-09 NOTE — NURSING NOTE
Shaquille is here for a fasting med check.        Pre-visit Screening:  Immunizations:  up to date  Colonoscopy:  is up to date  Mammogram: NA  Asthma Action Test/Plan:  NA  PHQ9:  Done today  GAD7:  Done today  Questioned patient about current smoking habits Pt. quit smoking some time ago.  Ok to leave detailed message on voice mail for today's visit only Yes, phone # mobile

## 2020-12-09 NOTE — PROGRESS NOTES
Shaquille Roberts is a 60 year old male presents for routine health maintenance.    Current concerns: Accidentally ate a mint this AM forgot he was fasting.    Blood in stool: For the past 2-3 days has been getting bright red blood in stool. Occasionally twinge of pain. Has had external hemorrhoid before, but this is different. Denies constipation. Had colonoscopy 9/2016, where 5 year f/u was recommended due to polyps.    Would like to meet with a genetic counselor for alzheimer disease.     Body mass index is 31.08 kg/m .    Present exercise habits:  1-2 times/week  Present dietary habits:  eats regular meals and follows a balanced nutrition diet    Vit D intake: is taking supplement    Is the patient a smoker? No  If yes, smoking cessation advised and counseling provided.     Cardiovascular risk factors: previous smoker and lipids    Over the past few weeks, have you felt down or depressed? Little interest or pleasure in doing things? No concerns    Last dental appointment:  this year  Last optical appointment:  this year    Was the patient born between [birth year] and has not had Hep C testing?  Patient has already been tested    I have reviewed the following histories: Past Medical History, Past Surgical History, Social History, Family History, Problem List, Medication List and Allergies    Past Medical History:   Diagnosis Date     ALLERGIC RHINITIS NOS-mold 12/10/2001     MIGRAINE NEC W/O MENTN INTRACTABLE 8/3/2004     Family History   Problem Relation Age of Onset     Hypertension Mother      Alzheimer Disease Mother      Depression Father      Cerebrovascular Disease Maternal Grandfather         in his 50s     Lymphoma Son      Diabetes No family hx of      C.A.D. No family hx of      Cancer - colorectal No family hx of      Prostate Cancer No family hx of      Social History     Socioeconomic History     Marital status:      Spouse name: Elizabeth     Number of children: 1     Years of education: 18      Highest education level: Not on file   Occupational History     Occupation: [employer]     Comment: [employer]   Social Needs     Financial resource strain: Not on file     Food insecurity     Worry: Not on file     Inability: Not on file     Transportation needs     Medical: Not on file     Non-medical: Not on file   Tobacco Use     Smoking status: Former Smoker     Packs/day: 1.00     Years: 15.00     Pack years: 15.00     Types: Cigarettes     Quit date: 1999     Years since quittin.9     Smokeless tobacco: Never Used   Substance and Sexual Activity     Alcohol use: Not Currently     Alcohol/week: 1.7 standard drinks     Types: 2 drink(s) per week     Comment: sober for one year     Drug use: No     Sexual activity: Yes     Partners: Female     Birth control/protection: Surgical     Comment: vas   Lifestyle     Physical activity     Days per week: Not on file     Minutes per session: Not on file     Stress: Not on file   Relationships     Social connections     Talks on phone: Not on file     Gets together: Not on file     Attends Islam service: Not on file     Active member of club or organization: Not on file     Attends meetings of clubs or organizations: Not on file     Relationship status: Not on file     Intimate partner violence     Fear of current or ex partner: Not on file     Emotionally abused: Not on file     Physically abused: Not on file     Forced sexual activity: Not on file   Other Topics Concern      Service Not Asked     Blood Transfusions Not Asked     Caffeine Concern Not Asked     Occupational Exposure Not Asked     Hobby Hazards Not Asked     Sleep Concern Not Asked     Stress Concern Not Asked     Weight Concern Not Asked     Special Diet Not Asked     Back Care Not Asked     Exercise Yes     Bike Helmet Not Asked     Seat Belt Yes     Self-Exams No   Social History Narrative     Not on file     Patient Active Problem List   Diagnosis     Other type of  "[facility]     Health Care Home     Major depressive disorder, recurrent episode, moderate (H)     Non morbid obesity due to excess calories     ACP (advance care planning)     Calcified granuloma of lung (H)     Traumatic complete tear of right rotator cuff     No current outpatient medications on file.     No current facility-administered medications for this visit.        Allergies:  No Known Allergies      ROS:  E/M: NEGATIVE for ear, nose, mouth and throat problems  R: NEGATIVE for significant/chronic cough or SOB  CV: NEGATIVE for chest pain or palpitations  GI: NEGATIVE for abdominal pain, chronic diarrhea or constipation  : NEGATIVE for dysuria, hematuria, weakened urinary stream      OBJECTIVE:    Vitals:    12/09/20 0804   BP: 126/76   Pulse: 70   Temp: 97.7  F (36.5  C)   TempSrc: Oral   SpO2: 98%   Weight: 102.5 kg (226 lb)   Height: 1.816 m (5' 11.5\")       General: 60 year old male who appears his stated age. Vital signs noted  Head: Normocephalic  Eyes: pupils equal round reactive to light and accomodation, extra ocular movements intact  Ears: external canals and tms free of abnormalities  Nose: patent, without mucosal abnormalities  Mouth and throat: without erythema or lesions of the mucosa  Neck: supple, without adenopathy or thyromegaly  Lungs: clear to auscultation, no wheezing or crackles  Cv: regular rate and rhythm, normal s1 and s2 without murmur or click  Abd: soft, non-tender, no masses, no hepatomegaly or splenomegaly.  Gu: Pt declined  Rectal: Pt declined, even with bleeding  Ms: normal muscle tone & symmetry  Skin: Clear to inspection  Neuro: sensation and motor function grossly intact; cranial nerves without obvious abnormalities.    ASSESSMENT/PLAN:    1. Encounter for general health examination  Shaquille is doing well today. Will update fasting labs, and send MyChart with results when available.  - VENOUS COLLECTION  - Lipid Panel ([facility])  - Comprehensive Metobolic Panel ([facility])  - Hemogram " "Platelet ([facility])    2. Elevated cholesterol  - VENOUS COLLECTION  - Lipid Panel ([facility])    3. Screening for metabolic disorder  - VENOUS COLLECTION  - Hemogram Platelet ([facility])    4. Screening for blood disease  - VENOUS COLLECTION  - Hemogram Platelet ([facility])    5. Class 1 obesity due to excess calories without serious comorbidity with body mass index (BMI) of 31.0 to 31.9 in adult  Encouraged healthy diet and exercise with goal of weight loss.    6. Family history of Alzheimer's disease  Agreed to referral to genetic counselor to determine possible testing for FH AD  - GENETICS REFERRAL    7. Rectal bleeding  Suspect this is internal hemorrhoid. Advised fluids, fiber, contact me in 2 weeks if not resolved, and would refer to colo-rectal.     8. Major depressive disorder, recurrent, in full remission (H)  Has been off Wellbutrin over 1 year and doing well. Advised vitamin D.        reports that he quit smoking about 21 years ago. His smoking use included cigarettes. He has a 15.00 pack-year smoking history. He has never used smokeless tobacco.    Estimated body mass index is 31.08 kg/m  as calculated from the following:    Height as of this encounter: 1.816 m (5' 11.5\").    Weight as of this encounter: 102.5 kg (226 lb).    Labs pending:      Fasting glucose      Fasting lipids  Meds Suggested:      Vitamin D       Calcium  Tests Recommended:      Regular Dental Examinations   Behavior Modifications:       Cardiovascular exercise 3 times per week--enough to get your Target Heart rate  Other recommendations:     BMI noted and discussed      Regular testicle exam     Encouraged My Chart    The patient will return to the clinic if symptoms are changing or concern with follow up as discussed. The patient understands and agrees with the plan.    Brisa Whiteside PA-C  12/9/2020      Counseling Resources:  ATP IV Guidelines  Pooled Cohorts Equation Calculator  Breast Cancer Risk Calculator  FRAX Risk Assessment  ICSI Preventive " Guidelines  Dietary Guidelines for Americans, 2010  USDA's MyPlate

## 2020-12-10 ASSESSMENT — ANXIETY QUESTIONNAIRES: GAD7 TOTAL SCORE: 1

## 2021-01-27 NOTE — TELEPHONE ENCOUNTER
L/M advising of 30 day refill and need for ov  
Shaquille GENTRY Roberts is due for a medication recheck for the following medication Wellbutrin ,and meets the qualifications for a physician approved 30 day extension.    A #30 day refill has been called into the pharmacy listed.     staff, per the refill protocol can you please call the patient and help assist in setting up a NON FASTING medication recheck.  Please attempt to reach the patient to schedule that appointment, and if you are unable to reach them, please forward back to the prescribing physician.      Telephone Information:   Mobile [phone] (home)       Thank You  LILY Dueñas    
Eat healthy foods you enjoy. Apixaban/Eliquis DOES NOT have a special diet. Limit your alcohol intake.

## 2021-04-26 ENCOUNTER — OFFICE VISIT (OUTPATIENT)
Dept: FAMILY MEDICINE | Facility: CLINIC | Age: 61
End: 2021-04-26

## 2021-04-26 VITALS
SYSTOLIC BLOOD PRESSURE: 140 MMHG | HEIGHT: 72 IN | DIASTOLIC BLOOD PRESSURE: 80 MMHG | HEART RATE: 72 BPM | BODY MASS INDEX: 31.69 KG/M2 | WEIGHT: 234 LBS | OXYGEN SATURATION: 98 % | TEMPERATURE: 97.9 F

## 2021-04-26 DIAGNOSIS — R30.0 DYSURIA: ICD-10-CM

## 2021-04-26 DIAGNOSIS — N34.2 URETHRITIS: Primary | ICD-10-CM

## 2021-04-26 LAB
ALBUMIN (URINE): NORMAL MG/DL
APPEARANCE UR: CLEAR
BACTERIA, UR: NORMAL
BILIRUB UR QL: NORMAL
CASTS/LPF: NORMAL
COLOR UR: YELLOW
EP/HPF: NORMAL
GLUCOSE URINE: NORMAL MG/DL
HGB UR QL: NORMAL
KETONES UR QL: NORMAL MG/DL
LEUKOCYTE ESTERASE - QUEST: NORMAL
MISC.: NORMAL
NITRITE UR QL STRIP: NORMAL
PH UR STRIP: 6.5 PH (ref 5–7)
RBC, UR MICRO: NORMAL (ref ?–2)
SP. GRAVITY: 1.02
UROBILINOGEN UR QL STRIP: 0.2 EU/DL (ref 0.2–1)
WBC, UR MICRO: NORMAL (ref ?–2)

## 2021-04-26 PROCEDURE — 36415 COLL VENOUS BLD VENIPUNCTURE: CPT | Performed by: PHYSICIAN ASSISTANT

## 2021-04-26 PROCEDURE — 99213 OFFICE O/P EST LOW 20 MIN: CPT | Performed by: PHYSICIAN ASSISTANT

## 2021-04-26 PROCEDURE — 81001 URINALYSIS AUTO W/SCOPE: CPT | Performed by: PHYSICIAN ASSISTANT

## 2021-04-26 RX ORDER — VITAMIN B COMPLEX
TABLET ORAL DAILY
COMMUNITY

## 2021-04-26 ASSESSMENT — MIFFLIN-ST. JEOR: SCORE: 1910.45

## 2021-04-26 NOTE — PROGRESS NOTES
"CC: Urinary pain    History:  Shaquille is here today with pelvic pain and burning when urinating. Pain is along shaft of penis where urethra is and only when urinating. Does get intermittent pelvic pain as well. Has been getting more frequent urination. No blood in urine. No sexual exposures. Symptoms have been stable. No back pain. He does mention that he has been urinating 3-4 times per night for the past year. No personal or FH of prostate cancer.     Drinks 4-5 caffeine drinks- either cans of Diet Coke or coffee. He drinks 1 glass of orange, and maybe 1 glass of water.     PMH, MEDICATIONS, ALLERGIES, SOCIAL AND FAMILY HISTORY in [facility] and reviewed by me personally.    ROS negative other than the symptoms noted above in the HPI.        Examination   BP (!) 140/80 (BP Location: Left arm, Patient Position: Sitting, Cuff Size: Adult Large)   Pulse 72   Temp 97.9  F (36.6  C) (Temporal)   Ht 1.822 m (5' 11.75\")   Wt 106.1 kg (234 lb)   SpO2 98%   BMI 31.96 kg/m         Constitutional: Sitting comfortably, in no acute distress. Vital signs noted  Neck:  no adenopathy, trachea midline and normal to palpation, thyroid normal to palpation  Cardiovascular:  regular rate and rhythm, no murmurs, clicks, or gallops  Respiratory:  normal respiratory rate and rhythm, lungs clear to auscultation  M/S: No CVA tenderness  SKIN: No jaundice/pallor/rash.   Psychiatric: mentation appears normal and affect normal/bright        A/P    ICD-10-CM    1. Urethritis  N34.2    2. Dysuria  R30.0 HCL  Urinalysis, Routine ([facility])     VENOUS COLLECTION     PSA Total ([facility])       DISCUSSION:  UA today appears normal. Will check PSA as well given increased urination. Suspect this is urethritis from inadequate water intake, and excess bladder irritants/caffeine. Recommended push fluids, decrease bladder irritants. I will contact him later this week with PSA results. If not improving by the end of the week, asked him to contact me.     follow up " visit: As needed    Brisa Greco PA-C  [facility]

## 2021-04-28 ENCOUNTER — MYC MEDICAL ADVICE (OUTPATIENT)
Dept: FAMILY MEDICINE | Facility: CLINIC | Age: 61
End: 2021-04-28

## 2021-04-28 LAB — ABBOTT PSA - QUEST: 2.3 NG/ML

## 2021-05-11 DIAGNOSIS — G43.909 MIGRAINE WITHOUT STATUS MIGRAINOSUS, NOT INTRACTABLE, UNSPECIFIED MIGRAINE TYPE: Primary | ICD-10-CM

## 2021-05-11 NOTE — TELEPHONE ENCOUNTER
Pt called to request a refill. Pt stating they have been getting migraines recently.    Pending Prescriptions:                       Disp   Refills    SUMAtriptan (IMITREX) 50 MG tablet                            Sig: Take 1 tablet (50 mg) by mouth at onset of           headache for migraine May repeat in 2 hours. Max           4 tablets/24 hours.

## 2021-05-12 RX ORDER — SUMATRIPTAN 50 MG/1
50 TABLET, FILM COATED ORAL
Qty: 18 TABLET | Refills: 0 | Status: SHIPPED | OUTPATIENT
Start: 2021-05-12 | End: 2021-12-13

## 2021-10-23 ENCOUNTER — HEALTH MAINTENANCE LETTER (OUTPATIENT)
Age: 61
End: 2021-10-23

## 2021-11-15 ENCOUNTER — TELEPHONE (OUTPATIENT)
Dept: FAMILY MEDICINE | Facility: CLINIC | Age: 61
End: 2021-11-15

## 2021-11-15 NOTE — TELEPHONE ENCOUNTER
Pt called stating he tested POS for COVID yesterday at a [employer] through. He stated he only has a very slight cough, but overall feels well. He does not believe this test and is wondering what the likelihood that this was incorrect. I advised him to get re-tested if he is questioning the test. Advised him I do not know the likelihood of a false positive. Pt was agreeable and will go to be re-tested.   This office note has been dictated.

## 2021-12-13 ENCOUNTER — OFFICE VISIT (OUTPATIENT)
Dept: FAMILY MEDICINE | Facility: CLINIC | Age: 61
End: 2021-12-13

## 2021-12-13 VITALS
HEIGHT: 72 IN | OXYGEN SATURATION: 98 % | DIASTOLIC BLOOD PRESSURE: 78 MMHG | HEART RATE: 69 BPM | SYSTOLIC BLOOD PRESSURE: 124 MMHG | WEIGHT: 222 LBS | TEMPERATURE: 97.4 F | BODY MASS INDEX: 30.07 KG/M2

## 2021-12-13 DIAGNOSIS — G43.909 MIGRAINE WITHOUT STATUS MIGRAINOSUS, NOT INTRACTABLE, UNSPECIFIED MIGRAINE TYPE: ICD-10-CM

## 2021-12-13 DIAGNOSIS — Z13.220 SCREENING FOR LIPID DISORDERS: ICD-10-CM

## 2021-12-13 DIAGNOSIS — M54.12 CERVICAL RADICULOPATHY: ICD-10-CM

## 2021-12-13 DIAGNOSIS — Z00.00 ENCOUNTER FOR GENERAL HEALTH EXAMINATION: Primary | ICD-10-CM

## 2021-12-13 DIAGNOSIS — F32.A MILD DEPRESSION: ICD-10-CM

## 2021-12-13 DIAGNOSIS — Z12.11 SPECIAL SCREENING FOR MALIGNANT NEOPLASMS, COLON: ICD-10-CM

## 2021-12-13 DIAGNOSIS — Z13.228 SCREENING FOR METABOLIC DISORDER: ICD-10-CM

## 2021-12-13 DIAGNOSIS — F43.21 GRIEF REACTION: ICD-10-CM

## 2021-12-13 PROCEDURE — 36415 COLL VENOUS BLD VENIPUNCTURE: CPT | Performed by: PHYSICIAN ASSISTANT

## 2021-12-13 PROCEDURE — 80053 COMPREHEN METABOLIC PANEL: CPT | Performed by: PHYSICIAN ASSISTANT

## 2021-12-13 PROCEDURE — 80061 LIPID PANEL: CPT | Mod: 90 | Performed by: PHYSICIAN ASSISTANT

## 2021-12-13 PROCEDURE — 99396 PREV VISIT EST AGE 40-64: CPT | Performed by: PHYSICIAN ASSISTANT

## 2021-12-13 RX ORDER — SUMATRIPTAN 50 MG/1
50 TABLET, FILM COATED ORAL
Qty: 18 TABLET | Refills: 0 | Status: SHIPPED | OUTPATIENT
Start: 2021-12-13

## 2021-12-13 ASSESSMENT — ANXIETY QUESTIONNAIRES
5. BEING SO RESTLESS THAT IT IS HARD TO SIT STILL: NOT AT ALL
6. BECOMING EASILY ANNOYED OR IRRITABLE: NOT AT ALL
IF YOU CHECKED OFF ANY PROBLEMS ON THIS QUESTIONNAIRE, HOW DIFFICULT HAVE THESE PROBLEMS MADE IT FOR YOU TO DO YOUR WORK, TAKE CARE OF THINGS AT HOME, OR GET ALONG WITH OTHER PEOPLE: NOT DIFFICULT AT ALL
1. FEELING NERVOUS, ANXIOUS, OR ON EDGE: NOT AT ALL
GAD7 TOTAL SCORE: 0
3. WORRYING TOO MUCH ABOUT DIFFERENT THINGS: NOT AT ALL
7. FEELING AFRAID AS IF SOMETHING AWFUL MIGHT HAPPEN: NOT AT ALL
2. NOT BEING ABLE TO STOP OR CONTROL WORRYING: NOT AT ALL

## 2021-12-13 ASSESSMENT — MIFFLIN-ST. JEOR: SCORE: 1851.02

## 2021-12-13 ASSESSMENT — PATIENT HEALTH QUESTIONNAIRE - PHQ9
SUM OF ALL RESPONSES TO PHQ QUESTIONS 1-9: 8
5. POOR APPETITE OR OVEREATING: NOT AT ALL

## 2021-12-13 NOTE — PROGRESS NOTES
Shaquille Roberts is a 61 year old male presents for routine health maintenance.    Current concerns:   Neck injury: May have injured disc in cervical spine 1 week ago. Has history of needing lumbar surgery and this seems similar. Would like to meet with spine specialist.     Depression, grief reaction:  History of depression many years ago. Was doing well for the most part, but hisson passed away from lymphoma 1/2021. Since then he can tell mental health has been worse- feeling down, not enjoying things, tearfulness, and some thoughts of what it would be like to not be hear. He has tried to call several therapist through his work program as this would be most affordable, but has been unable to schedule with therapist. Not interested in medication. He has tried medication in the past, and didn't like them. Feels like he will get through this alone, just takes time. Feels safe- no plan or intent to act on passive SI. Previous medication trials were Wellbutrin, citalopram.     Body mass index is 30.32 kg/m .    Present exercise habits:  Stays active, but necessarily formal exercise. Meeting with  today.   Present dietary habits:  eats regular meals and follows a balanced nutrition diet    Vit D intake: is not taking supplement    Is the patient a smoker? No  If yes, smoking cessation advised and counseling provided.     Cardiovascular risk factors: none    Over the past few weeks, have you felt down or depressed? Little interest or pleasure in doing things? Not doing well. See above.    Last dental appointment:  this year  Last optical appointment:  this year    Was the patient born between [birth year] and has not had Hep C testing?  No, not applicable    I have reviewed the following histories: Past Medical History, Past Surgical History, Social History, Family History, Problem List, Medication List and Allergies    Past Medical History:   Diagnosis Date     ALLERGIC RHINITIS NOS-mold 12/10/2001      MIGRAINE NEC W/O MENTN INTRACTABLE 8/3/2004     Family History   Problem Relation Age of Onset     Hypertension Mother      Alzheimer Disease Mother         80s     Depression Father      Cerebrovascular Disease Maternal Grandfather         in his 50s     Lymphoma Son         1/2021     Diabetes No family hx of      C.A.D. No family hx of      Cancer - colorectal No family hx of      Prostate Cancer No family hx of      Social History     Socioeconomic History     Marital status:      Spouse name: Elizabeth     Number of children: 1     Years of education: 18     Highest education level: Not on file   Occupational History     Occupation: [employer]     Comment: [employer]   Tobacco Use     Smoking status: Not on file     Smokeless tobacco: Never Used   Substance and Sexual Activity     Alcohol use: Not Currently     Alcohol/week: 1.7 standard drinks     Types: 2 drink(s) per week     Comment: sober since 12/31/2019     Drug use: No     Sexual activity: Yes     Partners: Female     Birth control/protection: Surgical     Comment: vas   Other Topics Concern      Service Not Asked     Blood Transfusions Not Asked     Caffeine Concern Not Asked     Occupational Exposure Not Asked     Hobby Hazards Not Asked     Sleep Concern Not Asked     Stress Concern Not Asked     Weight Concern Not Asked     Special Diet Not Asked     Back Care Not Asked     Exercise Yes     Bike Helmet Not Asked     Seat Belt Yes     Self-Exams No   Social History Narrative     Not on file     Social Determinants of Health     Financial Resource Strain: Not on file   Food Insecurity: Not on file   Transportation Needs: Not on file   Physical Activity: Not on file   Stress: Not on file   Social Connections: Not on file   Intimate Partner Violence: Not on file   Housing Stability: Not on file     Patient Active Problem List   Diagnosis     Other type of migraine     Health Care Home     Class 1 obesity due to excess calories  "without serious comorbidity with body mass index (BMI) of 31.0 to 31.9 in adult     ACP (advance care planning)     Calcified granuloma of lung (H)     Elevated cholesterol     Major depressive disorder, recurrent, in full remission (H)     Family history of Alzheimer's disease     Current Outpatient Medications   Medication     Cyanocobalamin (B-12) 1000 MCG TBCR     SUMAtriptan (IMITREX) 50 MG tablet     Turmeric 1053 MG TABS     Vitamin D3 (CHOLECALCIFEROL) 25 mcg (1000 units) tablet     No current facility-administered medications for this visit.       Allergies:  No Known Allergies      ROS:  E/M: NEGATIVE for ear, nose, mouth and throat problems  R: NEGATIVE for significant/chronic cough or SOB  CV: NEGATIVE for chest pain or palpitations  GI: NEGATIVE for abdominal pain, chronic diarrhea or constipation  : NEGATIVE for dysuria, hematuria, weakened urinary stream      OBJECTIVE:    Vitals:    12/13/21 1628   BP: 124/78   BP Location: Left arm   Patient Position: Sitting   Cuff Size: Adult Large   Pulse: 69   Temp: 97.4  F (36.3  C)   TempSrc: Temporal   SpO2: 98%   Weight: 100.7 kg (222 lb)   Height: 1.822 m (5' 11.75\")       General: 61 year old male who appears his stated age. Vital signs noted.  Head: Normocephalic  Eyes: pupils equal round reactive to light and accomodation, extra ocular movements intact  Ears: external canals and tms free of abnormalities  Nose: patent, without mucosal abnormalities  Mouth and throat: without erythema or lesions of the mucosa  Neck: supple, without adenopathy or thyromegaly  Lungs: clear to auscultation, no wheezing or crackles  Cv: regular rate and rhythm, normal s1 and s2 without murmur or click  Abd: soft, non-tender, no masses, no hepatomegaly or splenomegaly.  Gu:  Pt declined. Does own exams.   Rectal: Not indicated.   Ms: normal muscle tone & symmetry  Skin: Clear to inspection  Neuro: sensation and motor function grossly intact; cranial nerves without obvious " "abnormalities.    ASSESSMENT/PLAN:    Encounter for general health examination  Mild depression (H)  Grief reaction  Doing well physical, but mental health is not as well controlled following passing of his son. Offered medication trial, Genesight testing, referral to therapy, but pt declines for now. Did bring up support group, but pt does not like this concept after being involved in caregiver support group. For now, I do feel that he is safe, but asked him to contact us right away if getting worse, interested in pursuing further options. [facility] fasting [facility]     Migraine without status migrainosus, not intractable, unspecified migraine type  Only using rarely. Will refill as needed for 1 year.   - SUMAtriptan (IMITREX) 50 MG tablet; Take 1 tablet (50 mg) by mouth at onset of headache for migraine May repeat in 2 hours. Max 4 tablets/24 hours.    Special screening for malignant neoplasms, colon  Referred for screening colonoscopy.  - Adult Gastro Ref - Procedure Only    Screening for metabolic disorder  - Comprehensive Metobolic Panel ([facility])    Screening for lipid disorders  - Lipid Panel ([facility])    Cervical radiculopathy  Will submit referral for spine specialist for further work-up.   - Spine Referral; Future     does not have a smoking history on file. He has never used smokeless tobacco.    Estimated body mass index is 30.32 kg/m  as calculated from the following:    Height as of this encounter: 1.822 m (5' 11.75\").    Weight as of this encounter: 100.7 kg (222 lb).  Weight management plan: Discussed healthy diet and exercise guidelines    Labs pending:      Fasting glucose      Fasting lipids  Meds Suggested:      Vitamin D       Calcium  Tests Recommended:      Regular Dental Examinations        Eye exam  Behavior Modifications:       Cardiovascular exercise 3 times per week--enough to get your Target Heart rate  Other recommendations:    Health Care directive     BMI noted " and discussed      Regular testicle exam     Encouraged My Chart    The patient will return to the clinic if symptoms are changing or concern with follow up as discussed. The patient understands and agrees with the plan.      Brisa Greco PA-C  12/13/2021      Counseling Resources:  ATP IV Guidelines  Pooled Cohorts Equation Calculator  Breast Cancer Risk Calculator  FRAX Risk Assessment  ICSI Preventive Guidelines  Dietary Guidelines for Americans, 2010  [employer]'s MyPlate

## 2021-12-13 NOTE — NURSING NOTE
Chief Complaint   Patient presents with     Physical     fasting         Pre-visit Screening:  Immunizations:  up to date  Colonoscopy:  is due and ordered today  Mammogram: NA  Asthma Action Test/Plan:  NA  PHQ9:  Done today  GAD7:  Done today  Questioned patient about current smoking habits Pt. quit smoking some time ago.  Ok to leave detailed message on voice mail for today's visit only Yes, phone # [phone]

## 2021-12-14 LAB
ALBUMIN SERPL-MCNC: 4.3 G/DL (ref 3.6–5.1)
ALBUMIN/GLOB SERPL: 1.7 {RATIO} (ref 1–2.5)
ALP SERPL-CCNC: 67 U/L (ref 33–130)
ALT 1742-6: 27 U/L (ref 0–32)
AST 1920-8: 17 U/L (ref 0–35)
BILIRUB SERPL-MCNC: 0.7 MG/DL (ref 0.2–1.2)
BUN SERPL-MCNC: 19 MG/DL (ref 7–25)
BUN/CREATININE RATIO: 21.6 (ref 6–22)
CALCIUM SERPL-MCNC: 9.2 MG/DL (ref 8.6–10.3)
CHLORIDE SERPLBLD-SCNC: 103.6 MMOL/L (ref 98–110)
CHOLEST SERPL-MCNC: 237 MG/DL (ref 0–199)
CHOLEST/HDLC SERPL: 4 {RATIO} (ref 0–5)
CO2 SERPL-SCNC: 28.9 MMOL/L (ref 20–32)
CREAT SERPL-MCNC: 0.88 MG/DL (ref 0.6–1.3)
GLOBULIN, CALCULATED - QUEST: 2.6 (ref 1.9–3.7)
GLUCOSE SERPL-MCNC: 98 MG/DL (ref 60–99)
HDLC SERPL-MCNC: 65 MG/DL (ref 40–150)
LDLC SERPL CALC-MCNC: 155 MG/DL (ref 0–130)
POTASSIUM SERPL-SCNC: 4.58 MMOL/L (ref 3.5–5.3)
PROT SERPL-MCNC: 6.9 G/DL (ref 6.1–8.1)
SODIUM SERPL-SCNC: 139.3 MMOL/L (ref 135–146)
TRIGL SERPL-MCNC: 86 MG/DL (ref 0–149)

## 2021-12-14 ASSESSMENT — ANXIETY QUESTIONNAIRES: GAD7 TOTAL SCORE: 0

## 2022-03-18 ENCOUNTER — TRANSFERRED RECORDS (OUTPATIENT)
Dept: FAMILY MEDICINE | Facility: CLINIC | Age: 62
End: 2022-03-18

## 2022-10-09 ENCOUNTER — HEALTH MAINTENANCE LETTER (OUTPATIENT)
Age: 62
End: 2022-10-09

## 2023-01-17 ENCOUNTER — OFFICE VISIT (OUTPATIENT)
Dept: FAMILY MEDICINE | Facility: CLINIC | Age: 63
End: 2023-01-17

## 2023-01-17 VITALS
HEART RATE: 75 BPM | DIASTOLIC BLOOD PRESSURE: 82 MMHG | WEIGHT: 229 LBS | BODY MASS INDEX: 32.06 KG/M2 | HEIGHT: 71 IN | SYSTOLIC BLOOD PRESSURE: 136 MMHG | OXYGEN SATURATION: 97 % | TEMPERATURE: 97.2 F

## 2023-01-17 DIAGNOSIS — Z12.5 SCREENING FOR PROSTATE CANCER: ICD-10-CM

## 2023-01-17 DIAGNOSIS — M25.511 CHRONIC RIGHT SHOULDER PAIN: ICD-10-CM

## 2023-01-17 DIAGNOSIS — G89.29 CHRONIC RIGHT SHOULDER PAIN: ICD-10-CM

## 2023-01-17 DIAGNOSIS — G43.909 MIGRAINE WITHOUT STATUS MIGRAINOSUS, NOT INTRACTABLE, UNSPECIFIED MIGRAINE TYPE: ICD-10-CM

## 2023-01-17 DIAGNOSIS — Z13.220 SCREENING FOR LIPID DISORDERS: ICD-10-CM

## 2023-01-17 DIAGNOSIS — M79.645 CHRONIC THUMB PAIN, LEFT: ICD-10-CM

## 2023-01-17 DIAGNOSIS — Z13.228 SCREENING FOR METABOLIC DISORDER: ICD-10-CM

## 2023-01-17 DIAGNOSIS — G89.29 CHRONIC THUMB PAIN, LEFT: ICD-10-CM

## 2023-01-17 DIAGNOSIS — Z98.890 HISTORY OF ROTATOR CUFF SURGERY: ICD-10-CM

## 2023-01-17 DIAGNOSIS — Z00.00 ENCOUNTER FOR GENERAL MEDICAL EXAMINATION: Primary | ICD-10-CM

## 2023-01-17 LAB
ALBUMIN SERPL-MCNC: 4.2 G/DL (ref 3.6–5.1)
ALBUMIN/GLOB SERPL: 1.6 {RATIO} (ref 1–2.5)
ALP SERPL-CCNC: 66 U/L (ref 33–130)
ALT 1742-6: 16 U/L (ref 0–32)
AST 1920-8: 14 U/L (ref 0–35)
BILIRUB SERPL-MCNC: 0.9 MG/DL (ref 0.2–1.2)
BUN SERPL-MCNC: 15 MG/DL (ref 7–25)
BUN/CREATININE RATIO: 14 (ref 6–22)
CALCIUM SERPL-MCNC: 9.2 MG/DL (ref 8.6–10.3)
CHLORIDE SERPLBLD-SCNC: 103 MMOL/L (ref 98–110)
CHOLEST SERPL-MCNC: 217 MG/DL (ref 0–199)
CHOLEST/HDLC SERPL: 3 {RATIO} (ref 0–5)
CO2 SERPL-SCNC: 29.5 MMOL/L (ref 20–32)
CREAT SERPL-MCNC: 1.07 MG/DL (ref 0.6–1.3)
GLOBULIN, CALCULATED - QUEST: 2.6 (ref 1.9–3.7)
GLUCOSE SERPL-MCNC: 87 MG/DL (ref 60–99)
HDLC SERPL-MCNC: 68 MG/DL (ref 40–150)
LDLC SERPL CALC-MCNC: 130 MG/DL (ref 0–130)
POTASSIUM SERPL-SCNC: 4.2 MMOL/L (ref 3.5–5.3)
PROT SERPL-MCNC: 6.8 G/DL (ref 6.1–8.1)
SODIUM SERPL-SCNC: 138.1 MMOL/L (ref 135–146)
TRIGL SERPL-MCNC: 95 MG/DL (ref 0–149)

## 2023-01-17 PROCEDURE — 99396 PREV VISIT EST AGE 40-64: CPT | Performed by: PHYSICIAN ASSISTANT

## 2023-01-17 PROCEDURE — 36415 COLL VENOUS BLD VENIPUNCTURE: CPT | Performed by: PHYSICIAN ASSISTANT

## 2023-01-17 PROCEDURE — 80053 COMPREHEN METABOLIC PANEL: CPT | Performed by: PHYSICIAN ASSISTANT

## 2023-01-17 PROCEDURE — 80061 LIPID PANEL: CPT | Performed by: PHYSICIAN ASSISTANT

## 2023-01-17 ASSESSMENT — ANXIETY QUESTIONNAIRES
6. BECOMING EASILY ANNOYED OR IRRITABLE: NOT AT ALL
3. WORRYING TOO MUCH ABOUT DIFFERENT THINGS: SEVERAL DAYS
IF YOU CHECKED OFF ANY PROBLEMS ON THIS QUESTIONNAIRE, HOW DIFFICULT HAVE THESE PROBLEMS MADE IT FOR YOU TO DO YOUR WORK, TAKE CARE OF THINGS AT HOME, OR GET ALONG WITH OTHER PEOPLE: NOT DIFFICULT AT ALL
7. FEELING AFRAID AS IF SOMETHING AWFUL MIGHT HAPPEN: NOT AT ALL
GAD7 TOTAL SCORE: 3
5. BEING SO RESTLESS THAT IT IS HARD TO SIT STILL: NOT AT ALL
2. NOT BEING ABLE TO STOP OR CONTROL WORRYING: SEVERAL DAYS
GAD7 TOTAL SCORE: 3
1. FEELING NERVOUS, ANXIOUS, OR ON EDGE: SEVERAL DAYS

## 2023-01-17 ASSESSMENT — PATIENT HEALTH QUESTIONNAIRE - PHQ9
5. POOR APPETITE OR OVEREATING: NOT AT ALL
SUM OF ALL RESPONSES TO PHQ QUESTIONS 1-9: 4

## 2023-01-17 NOTE — NURSING NOTE
Chief Complaint   Patient presents with     Physical     Fasting cpx         Pre-visit Screening:  Immunizations:  up to date  Colonoscopy:  is up to date  Mammogram: NA  Asthma Action Test/Plan:  NA  PHQ9:  NA  GAD7:  NA  Questioned patient about current smoking habits Pt. Quit some time ago  Ok to leave detailed message on voice mail for today's visit only Yes, phone # [phone]

## 2023-01-17 NOTE — PROGRESS NOTES
Shaquille Roberts is a 62 year old male presents for routine health maintenance.    Current concerns:   Grief reaction:  At physical last year, had recently lost his son to lymphoma. Since that time, started seeing therapist twice monthy, vitamin D, , making it a prioity to sleep at least 6 hours. Feels like he is doing significantly better.     Pain in left hand:  Pain at base of left for the past 1 year or more. Hurts to play guitar. Would like referral to ortho.    Right shoulder pain:  Continues to have right shoulder pain/tightness after injury 12/9/2021 and subsequent surgical repair of rotator cuff. PT at that time was discontinued due to pandemic. Would like to go back to see if this can be corrected.     Body mass index is 31.94 kg/m .    Present exercise habits:  3-5 times/week  Present dietary habits:  eats regular meals and follows a balanced nutrition diet    Vit D intake: is taking supplement    Is the patient a smoker? No  If yes, smoking cessation advised and counseling provided.     Cardiovascular risk factors: none    Over the past few weeks, have you felt down or depressed? Little interest or pleasure in doing things?  No concerns, Doing well with therapist.     Last dental appointment:  this year  Last optical appointment:  this year    Was the patient born between [birth year] and has not had Hep C testing?  No, not applicable    I have reviewed the following histories: Past Medical History, Past Surgical History, Social History, Family History, Problem List, Medication List and Allergies    Past Medical History:   Diagnosis Date     ALLERGIC RHINITIS NOS-mold 12/10/2001     MIGRAINE NEC W/O MENTN INTRACTABLE 8/3/2004     Family History   Problem Relation Age of Onset     Hypertension Mother      Alzheimer Disease Mother         80s     Depression Father      Cerebrovascular Disease Maternal Grandfather         in his 50s     Lymphoma Son         1/2021     Diabetes No family hx of       C.A.D. 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     Comment: [employer]   Tobacco Use     Smoking status: Former     Packs/day: 1.00     Years: 15.00     Pack years: 15.00     Types: Cigarettes     Quit date: 1999     Years since quittin.0     Smokeless tobacco: Never     Tobacco comments:     Quit Aug 1999   Substance and Sexual Activity     Alcohol use: Not Currently     Comment: sober since 2019     Drug use: No     Sexual activity: Yes     Partners: Female     Birth control/protection: Surgical     Comment: vas   Other Topics Concern      Service Not Asked     Blood Transfusions Not Asked     Caffeine Concern Not Asked     Occupational Exposure Not Asked     Hobby Hazards Not Asked     Sleep Concern Not Asked     Stress Concern Not Asked     Weight Concern Not Asked     Special Diet Not Asked     Back Care Not Asked     Exercise Yes     Bike Helmet Not Asked     Seat Belt Yes     Self-Exams No   Social History Narrative     Not on file     Social Determinants of Health     Financial Resource Strain: Not on file   Food Insecurity: Not on file   Transportation Needs: Not on file   Physical Activity: Not on file   Stress: Not on file   Social Connections: Not on file   Intimate Partner Violence: Not on file   Housing Stability: Not on file     Patient Active Problem List   Diagnosis     Other type of migraine     Health Care Home     Class 1 obesity due to excess calories without serious comorbidity with body mass index (BMI) of 31.0 to 31.9 in adult     ACP (advance care planning)     Calcified granuloma of lung (H)     Elevated cholesterol     Major depressive disorder, recurrent, in  "full remission (H)     Family history of Alzheimer's disease     Current Outpatient Medications   Medication     Cyanocobalamin (B-12) 1000 MCG TBCR     diphenhydrAMINE-acetaminophen (TYLENOL PM)  MG tablet     Turmeric 1053 MG TABS     Vitamin D3 (CHOLECALCIFEROL) 25 mcg (1000 units) tablet     SUMAtriptan (IMITREX) 50 MG tablet     No current facility-administered medications for this visit.       Allergies:  No Known Allergies      ROS:  E/M: NEGATIVE for ear, nose, mouth and throat problems  R: NEGATIVE for significant/chronic cough or SOB  CV: NEGATIVE for chest pain or palpitations  GI: NEGATIVE for abdominal pain, chronic diarrhea or constipation  : NEGATIVE for dysuria, hematuria, weakened urinary stream      OBJECTIVE:    Vitals:    01/17/23 1440   BP: 136/82   BP Location: Left arm   Patient Position: Sitting   Cuff Size: Adult Large   Pulse: 75   Temp: 97.2  F (36.2  C)   TempSrc: Temporal   SpO2: 97%   Weight: 103.9 kg (229 lb)   Height: 1.803 m (5' 11\")       General: 62 year old male who appears his stated age. Vital signs noted  Head: Normocephalic  Eyes: pupils equal round reactive to light and accomodation, extra ocular movements intact  Ears: external canals and tms free of abnormalities  Nose: patent, without mucosal abnormalities  Mouth and throat: without erythema or lesions of the mucosa  Neck: supple, without adenopathy or thyromegaly  Lungs: clear to auscultation, no wheezing or crackles  Cv: regular rate and rhythm, normal s1 and s2 without murmur or click  Abd: soft, non-tender, no masses, no hepatomegaly or splenomegaly.  Gu: Pt declined. No concerns.   Rectal: Checking PSA  Ms: normal muscle tone & symmetry. Did not examine hand or shoulder in detail as part of physical, will defer to orthopedic specialist, PT.  Skin: Clear to inspection  Neuro: sensation and motor function grossly intact; cranial nerves without obvious abnormalities.    ASSESSMENT/PLAN:    Encounter for general " "medical examination  Shaquille is doing well today. Will update fasting labs, PSA (pt having slightly more frequency, but does have a lot of spicy food), and send MyChart with results.     Screening for lipid disorders  - VENOUS COLLECTION  - Lipid Panel ([facility])    Screening for metabolic disorder  - Comprehensive Metobolic Panel ([facility])    Screening for prostate cancer  - PSA Total ([facility])    Migraine without status migrainosus, not intractable, unspecified migraine type  Not needing sumatriptan hardly at all. Will refill as needed for 1 year.    Chronic thumb pain, left  Suspect arthritis vs tendonitis. May need injection. Will refer to ortho.  - Orthopedic  Referral; Future    History of rotator cuff surgery  Chronic right shoulder pain  Will refer back to PT.  - Physical Therapy Referral; Future     reports that he quit smoking about 24 years ago. His smoking use included cigarettes. He has a 15.00 pack-year smoking history. He has never used smokeless tobacco.    Estimated body mass index is 31.94 kg/m  as calculated from the following:    Height as of this encounter: 1.803 m (5' 11\").    Weight as of this encounter: 103.9 kg (229 lb).  Weight management plan: Discussed healthy diet and exercise guidelines    Labs pending:      Fasting glucose      Fasting lipids  Meds Suggested:      Vitamin D       Calcium  Tests Recommended:      Regular Dental Examinations        Eye exam  Behavior Modifications:       Cardiovascular exercise 3 times per week--enough to get your Target Heart rate  Other recommendations:     BMI noted and discussed      Regular testicle exam    The patient will return to the clinic if symptoms are changing or concern with follow up as discussed. The patient understands and agrees with the plan.    Brisa Greco PA-C  1/17/2023      Counseling Resources:  ATP IV Guidelines  Pooled Cohorts Equation Calculator  Breast Cancer Risk Calculator  FRAX Risk Assessment  ICSI Preventive " Guidelines  Dietary Guidelines for Americans, 2010  USDA's MyPlate

## 2023-01-18 LAB — ABBOTT PSA - QUEST: 3.23 NG/ML

## 2023-02-01 ENCOUNTER — TRANSFERRED RECORDS (OUTPATIENT)
Dept: FAMILY MEDICINE | Facility: CLINIC | Age: 63
End: 2023-02-01

## 2024-01-26 ENCOUNTER — OFFICE VISIT (OUTPATIENT)
Dept: FAMILY MEDICINE | Facility: CLINIC | Age: 64
End: 2024-01-26

## 2024-01-26 VITALS
HEIGHT: 71 IN | RESPIRATION RATE: 20 BRPM | WEIGHT: 218 LBS | TEMPERATURE: 97.3 F | DIASTOLIC BLOOD PRESSURE: 80 MMHG | SYSTOLIC BLOOD PRESSURE: 142 MMHG | HEART RATE: 72 BPM | BODY MASS INDEX: 30.52 KG/M2

## 2024-01-26 DIAGNOSIS — Z13.220 SCREENING FOR LIPID DISORDERS: ICD-10-CM

## 2024-01-26 DIAGNOSIS — Z00.00 ENCOUNTER FOR GENERAL HEALTH EXAMINATION: Primary | ICD-10-CM

## 2024-01-26 DIAGNOSIS — Z12.5 SCREENING FOR PROSTATE CANCER: ICD-10-CM

## 2024-01-26 DIAGNOSIS — G43.009 MIGRAINE WITHOUT AURA AND WITHOUT STATUS MIGRAINOSUS, NOT INTRACTABLE: ICD-10-CM

## 2024-01-26 DIAGNOSIS — Z13.228 SCREENING FOR METABOLIC DISORDER: ICD-10-CM

## 2024-01-26 DIAGNOSIS — Z71.89 ACP (ADVANCE CARE PLANNING): ICD-10-CM

## 2024-01-26 DIAGNOSIS — F33.42 MAJOR DEPRESSIVE DISORDER, RECURRENT, IN FULL REMISSION (H): ICD-10-CM

## 2024-01-26 LAB
ALBUMIN SERPL-MCNC: 4.4 G/DL (ref 3.6–5.1)
ALBUMIN/GLOB SERPL: 1.6 {RATIO} (ref 1–2.5)
ALP SERPL-CCNC: 69 U/L (ref 33–130)
ALT 1742-6: 22 U/L (ref 0–32)
AST 1920-8: 17 U/L (ref 0–35)
BILIRUB SERPL-MCNC: 0.8 MG/DL (ref 0.2–1.2)
BUN SERPL-MCNC: 16 MG/DL (ref 7–25)
BUN/CREATININE RATIO: 15.4 (ref 6–32)
CALCIUM SERPL-MCNC: 9.3 MG/DL (ref 8.6–10.3)
CHLORIDE SERPLBLD-SCNC: 102.7 MMOL/L (ref 98–110)
CHOLEST SERPL-MCNC: 247 MG/DL (ref 0–199)
CHOLEST/HDLC SERPL: 3 {RATIO} (ref 0–5)
CO2 SERPL-SCNC: 29.1 MMOL/L (ref 20–32)
CREAT SERPL-MCNC: 1.04 MG/DL (ref 0.6–1.3)
GLOBULIN, CALCULATED - QUEST: 2.8 (ref 1.9–3.7)
GLUCOSE SERPL-MCNC: 88 MG/DL (ref 60–99)
HDLC SERPL-MCNC: 71 MG/DL (ref 40–150)
LDLC SERPL CALC-MCNC: 158 MG/DL (ref 0–130)
POTASSIUM SERPL-SCNC: 4.51 MMOL/L (ref 3.5–5.3)
PROT SERPL-MCNC: 7.2 G/DL (ref 6.1–8.1)
SODIUM SERPL-SCNC: 137.1 MMOL/L (ref 135–146)
TRIGL SERPL-MCNC: 92 MG/DL (ref 0–149)

## 2024-01-26 PROCEDURE — 36415 COLL VENOUS BLD VENIPUNCTURE: CPT | Performed by: PHYSICIAN ASSISTANT

## 2024-01-26 PROCEDURE — 80053 COMPREHEN METABOLIC PANEL: CPT | Performed by: PHYSICIAN ASSISTANT

## 2024-01-26 PROCEDURE — 80061 LIPID PANEL: CPT | Performed by: PHYSICIAN ASSISTANT

## 2024-01-26 PROCEDURE — 99396 PREV VISIT EST AGE 40-64: CPT | Performed by: PHYSICIAN ASSISTANT

## 2024-01-26 RX ORDER — AMOXICILLIN 500 MG
1200 CAPSULE ORAL DAILY
COMMUNITY
Start: 2024-01-26

## 2024-01-26 NOTE — NURSING NOTE
Shaquille Roberts is here for a CPX.    Pre-visit planning  Immunizations -up to date  Colonoscopy -is up to date  Mammogram -  Asthma test --  PHQ9 -  FELIBERTO 7 -      Questioned patient about current smoking habits.  Pt. quit smoking some time ago.  Body mass index is 30.19 kg/m .  PULSE regular  My Chart: active  CLASSIFICATION OF OVERWEIGHT AND OBESITY BY BMI                        Obesity Class           BMI(kg/m2)  Underweight                                    < 18.5  Normal                                         18.5-24.9  Overweight                                     25.0-29.9  OBESITY                     I                  30.0-34.9                             II                 35.0-39.9  EXTREME OBESITY             III                >40                            Patient's  BMI Body mass index is 30.19 kg/m .      The patient has verbalized that it is ok to leave a detailed voice message on the patient's cell phone with results/recommendations from this visit.

## 2024-01-26 NOTE — PROGRESS NOTES
Shaquille Roberts is a 63 year old male presents for routine health maintenance.    Current concerns:   Migraines:  Hasn't had one in over 1 year. Has small supply of sumatriptan at home just in case.      Depression, Grieving: Doing well. Continues to see his therapist twice monthly.    Body mass index is 30.19 kg/m .    Present exercise habits:  5 times/week  Present dietary habits:  eats regular meals and follows a balanced nutrition diet. Also has been     Vit D intake: is taking supplement    Is the patient a smoker? No  If yes, smoking cessation advised and counseling provided.     Cardiovascular risk factors: None    Over the past few weeks, have you felt down or depressed? Little interest or pleasure in doing things? Goes to therapist every other week.     Last dental appointment:  this year  Last optical appointment:  this year    Was the patient born between [birth year] and has not had Hep C testing?  Patient has already been tested    I have reviewed the following histories: Past Medical History, Past Surgical History, Social History, Family History, Problem List, Medication List and Allergies    Past Medical History:   Diagnosis Date    ALLERGIC RHINITIS NOS-mold 12/10/2001    MIGRAINE NEC W/O MENTN INTRACTABLE 8/3/2004     Family History   Problem Relation Age of Onset    Hypertension Mother     Alzheimer Disease Mother         80s    Depression Father     Cerebrovascular Disease Maternal Grandfather         in his 50s    Lymphoma Son         1/2021    Diabetes No family hx of     C.A.D. No family hx of     Cancer - colorectal No family hx of     Prostate Cancer No family hx of      Social History     Socioeconomic History    Marital status:      Spouse name: Elizabeth    Number of children: 1    Years of education: 18    Highest education level: Not on file   Occupational History    Occupation: Computer software     Comment: [employer]   Tobacco Use    Smoking status: Former     Packs/day: 1.00      Years: 15.00     Additional pack years: 0.00     Total pack years: 15.00     Types: Cigarettes     Quit date: 1999     Years since quittin.0     Passive exposure: Past    Smokeless tobacco: Never    Tobacco comments:     Quit Aug 1999   Substance and Sexual Activity    Alcohol use: Not Currently     Comment: sober since 2019    Drug use: No    Sexual activity: Yes     Partners: Female     Birth control/protection: Surgical     Comment: vas   Other Topics Concern     Service Not Asked    Blood Transfusions Not Asked    Caffeine Concern Not Asked    Occupational Exposure Not Asked    Hobby Hazards Not Asked    Sleep Concern Not Asked    Stress Concern Not Asked    Weight Concern Not Asked    Special Diet Not Asked    Back Care Not Asked    Exercise Yes    Bike Helmet Not Asked    Seat Belt Yes    Self-Exams No   Social History Narrative    Not on file     Social Determinants of Health     Financial Resource Strain: Not on file   Food Insecurity: Not on file   Transportation Needs: Not on file   Physical Activity: Not on file   Stress: Not on file   Social Connections: Not on file   Interpersonal Safety: Not on file   Housing Stability: Not on file     Patient Active Problem List   Diagnosis    Health Care Home    Class 1 obesity due to excess calories without serious comorbidity with body mass index (BMI) of 30.0 to 30.9 in adult    ACP (advance care planning)    Calcified granuloma of lung (H)    Elevated cholesterol    Major depressive disorder, recurrent, in full remission (H24)    Family history of Alzheimer's disease    Migraine without aura and without status migrainosus, not intractable     Current Outpatient Medications   Medication    Omega-3 Fatty Acids (FISH OIL) 1200 MG capsule    SUMAtriptan (IMITREX) 50 MG tablet    Turmeric 1053 MG TABS    Vitamin D3 (CHOLECALCIFEROL) 25 mcg (1000 units) tablet     No current facility-administered medications for this visit.       Allergies:  No  "Known Allergies      ROS:  E/M: NEGATIVE for ear, nose, mouth and throat problems  R: NEGATIVE for significant/chronic cough or SOB  CV: NEGATIVE for chest pain or palpitations  GI: NEGATIVE for abdominal pain, chronic diarrhea or constipation  : NEGATIVE for dysuria, hematuria, weakened urinary stream      OBJECTIVE:    Vitals:    01/26/24 1253   BP: (!) 142/80   BP Location: Left arm   Patient Position: Chair   Cuff Size: Adult Regular   Pulse: 72   Resp: 20   Temp: 97.3  F (36.3  C)   TempSrc: Temporal   Weight: 98.9 kg (218 lb)   Height: 1.81 m (5' 11.25\")       General: 63 year old male who appears his stated age. Vital signs noted. BP elevated.  Head: Normocephalic  Eyes: pupils equal round reactive to light and accomodation, extra ocular movements intact  Ears: external canals and tms free of abnormalities  Nose: patent, without mucosal abnormalities  Mouth and throat: without erythema or lesions of the mucosa  Neck: supple, without adenopathy or thyromegaly  Lungs: clear to auscultation, no wheezing or crackles  Cv: regular rate and rhythm, normal s1 and s2 without murmur or click  Abd: soft, non-tender, no masses, no hepatomegaly or splenomegaly.  Gu: normal external genitalia, no hernia  Rectal: Checking PSA.   Ms: normal muscle tone & symmetry  Skin: Clear to inspection  Neuro: sensation and motor function grossly intact; cranial nerves without obvious abnormalities.    ASSESSMENT/PLAN:    Encounter for general health examination  Shaquille is doing well today. Will update fasting labs and send MyChart with results when available. Was having some urinary pain, frequency, but started to do a \"liver cleanse\" of cayenne pepper, lemon juice, and olive oil once monthly, and went away. Did discuss BP being above goal of <130/80 today, but he admits to having a lot of caffeine today without eating. Agree to monitor at home and return to clinic if elevated.     Screening for lipid disorders  - Lipid Panel ([facility])  - " "VENOUS COLLECTION    Screening for metabolic disorder  - Lipid Panel ([facility])  - Comprehensive Metobolic Panel ([facility])    Screening for prostate cancer  - PSA Total ([facility])    ACP (advance care planning)     reports that he quit smoking about 25 years ago. His smoking use included cigarettes. He has a 15 pack-year smoking history. He has been exposed to tobacco smoke. He has never used smokeless tobacco.    Estimated body mass index is 30.19 kg/m  as calculated from the following:    Height as of this encounter: 1.81 m (5' 11.25\").    Weight as of this encounter: 98.9 kg (218 lb).  Weight management plan: Discussed healthy diet and exercise guidelines    Labs pending:      Fasting glucose      Fasting lipids      PSA  Meds Suggested:       Aspirin       Vitamin D  Tests Recommended:      Regular Dental Examinations         Mammogram yearly  Behavior Modifications:       Cardiovascular exercise 3 times per week--enough to get your Target Heart rate  Other recommendations:     BMI noted and discussed      Regular breast exam     Encouraged My Chart    The patient will return to the clinic if symptoms are changing or concern with follow up as discussed. The patient understands and agrees with the plan.    Brisa Greco PA-C  1/26/2024    Counseling Resources:  ATP IV Guidelines  Pooled Cohorts Equation Calculator  Breast Cancer Risk Calculator  FRAX Risk Assessment  ICSI Preventive Guidelines  Dietary Guidelines for Americans, 2010  [employer]'s MyPlate    "

## 2024-01-27 LAB — ABBOTT PSA - QUEST: 3.62 NG/ML

## 2024-05-10 NOTE — TELEPHONE ENCOUNTER
We have filled for pt before. Agreed to refill 18 tablets. He should schedule appt if going through these quickly or not helping. Considering time of year, please also mention to him he could consider anti allergy medication like Allegra as seasonal allergies can cause headaches.    (3) slightly limited

## 2024-07-18 ENCOUNTER — OFFICE VISIT (OUTPATIENT)
Dept: FAMILY MEDICINE | Facility: CLINIC | Age: 64
End: 2024-07-18

## 2024-07-18 VITALS
BODY MASS INDEX: 30.55 KG/M2 | WEIGHT: 218.2 LBS | HEART RATE: 72 BPM | SYSTOLIC BLOOD PRESSURE: 158 MMHG | RESPIRATION RATE: 20 BRPM | TEMPERATURE: 97.1 F | OXYGEN SATURATION: 95 % | HEIGHT: 71 IN | DIASTOLIC BLOOD PRESSURE: 86 MMHG

## 2024-07-18 DIAGNOSIS — M79.18 RIGHT BUTTOCK PAIN: ICD-10-CM

## 2024-07-18 DIAGNOSIS — G57.01 PIRIFORMIS SYNDROME, RIGHT: Primary | ICD-10-CM

## 2024-07-18 PROCEDURE — 99213 OFFICE O/P EST LOW 20 MIN: CPT

## 2024-07-18 RX ORDER — CYCLOBENZAPRINE HCL 5 MG
5 TABLET ORAL 3 TIMES DAILY PRN
Qty: 30 TABLET | Refills: 0 | Status: SHIPPED | OUTPATIENT
Start: 2024-07-18 | End: 2024-09-10

## 2024-07-18 NOTE — PROGRESS NOTES
Assessment & Plan     1. Piriformis syndrome, right  - Discussed with Shaquille his symptoms and exam are most consistent with right piriformis syndrome. Recommend he continue to rest, alternate ice/heat, light stretches, etc. Will also RX Voltaren 50 mg BID and Flexeril 5 mg TID PRN to help with pain. Reviewed side effects of both medications. Expect gradual improvement of symptoms in next 2-3 weeks. Could consider PT if not improving or seeing Dr. Ortiz and/or [facility]. Return to clinic and ER precautions discussed.   - diclofenac (VOLTAREN) 50 MG EC tablet; Take 1 tablet (50 mg) by mouth 2 times daily as needed for moderate pain With food  Dispense: 30 tablet; Refill: 0  - cyclobenzaprine (FLEXERIL) 5 MG tablet; Take 1 tablet (5 mg) by mouth 3 times daily as needed for muscle spasms  Dispense: 30 tablet; Refill: 0    2. Right buttock pain  - See above.     Follow up as needed. Reasons to follow-up sooner or seek emergent care reviewed.     Kayla Bah PA-C  [facility]       Subjective     Shaquille Roberts is a 63 year old male who presents to clinic today for the following health issues:    HPI   Chief Complaint   Patient presents with    Leg Pain     Right upper leg/buttock      Shaquille presents with concerns of right buttock and leg pain for the last 3-4 weeks (early July). He notes he recently completed a 30 day gym marathon where he went to the gym daily and did a bunch of different exercises and thinks he over did it. Denies any specific injury or fall. He notes when his buttock/leg pain first started, he took a break from the gym and his symptoms improved but then he went back to the gym and tried to do a run but then had to stop due to pain. He notes the last few days his pain has been the worst and he has felt miserable. He notes he feels a burning pain in his right buttock that is worse when he is standing or walking. He also has pain radiating down the back and side of his right leg. He  "denies any symptoms of pain in his back or hip however is worried as he does have a history of a herniated disc and had pain down his left leg which only resolved with surgery. He denies any symptoms of fevers/chills, saddle anesthesia, urinary or bowel changes, etc. He has been taking Ibuprofen for his symptoms with minimal benefit.     Daily medications reviewed.       Objective    BP (!) 158/86 (BP Location: Left arm, Patient Position: Chair, Cuff Size: Adult Regular)   Pulse 72   Temp 97.1  F (36.2  C) (Temporal)   Resp 20   Ht 1.81 m (5' 11.25\")   Wt 99 kg (218 lb 3.2 oz)   SpO2 95%   BMI 30.22 kg/m    Body mass index is 30.22 kg/m .    Physical Examination:  GENERAL: healthy, alert and no distress  EYES: Eyes grossly normal to inspection  RESP: no audible wheezing  MS: no tenderness noted over thoracic and lumbar spinal processes, mild tenderness noted over right gluteus muscles, limited lumbar ROM due to pain, negative straight leg tests bilaterally, 5/5 strength testing of hips, legs, and feet bilaterally with intact sensation, normal lower deep tendon reflexes bilaterally, antalgic gait due to pain in right buttock and right leg  SKIN: no suspicious lesions or rashes  PSYCH: mentation appears normal, affect normal/bright    "

## 2024-07-18 NOTE — NURSING NOTE
Shaquille Roberts is here for right leg pain in his upper right leg for the past couple days. Has been getting more sore since that time. He is fine sitting, but more sore when walking.    Questioned patient about current smoking habits.  Pt. quit smoking some time ago.  PULSE regular  My Chart: active  CLASSIFICATION OF OVERWEIGHT AND OBESITY BY BMI                        Obesity Class           BMI(kg/m2)  Underweight                                    < 18.5  Normal                                         18.5-24.9  Overweight                                     25.0-29.9  OBESITY                     I                  30.0-34.9                             II                 35.0-39.9  EXTREME OBESITY             III                >40                            Patient's  BMI Body mass index is 30.22 kg/m .  http://hin.nhlbi.nih.gov/menuplanner/menu.cgi  Pre-visit planning  Immunizations - up to date  Colonoscopy - is up to date  Mammogram -   Asthma -   PHQ9 -    FELIBETRO-7 -      The patient has verbalized that it is ok to leave a detailed voice message on the patient's cell phone with results/recommendations from this visit.

## 2024-07-30 ENCOUNTER — TELEPHONE (OUTPATIENT)
Dept: FAMILY MEDICINE | Facility: CLINIC | Age: 64
End: 2024-07-30

## 2024-09-10 ENCOUNTER — OFFICE VISIT (OUTPATIENT)
Dept: FAMILY MEDICINE | Facility: CLINIC | Age: 64
End: 2024-09-10

## 2024-09-10 DIAGNOSIS — U07.1 INFECTION DUE TO 2019 NOVEL CORONAVIRUS: Primary | ICD-10-CM

## 2024-09-10 PROCEDURE — 99213 OFFICE O/P EST LOW 20 MIN: CPT | Mod: 95

## 2024-09-10 NOTE — PROGRESS NOTES
Assessment & Plan     1. Infection due to 2019 novel coronavirus  - Covid 19-higher risk due to age. We had an extensive discussion with Shaquille about Paxlovid. He does meet the criteria and I believe would benefit from the medication. We discussed common side effects. He does not take any medications that interact with Paxlovid. Shaquille has normal kidney function, last BMP reviewed from 01/26/24 and was WNL. Encouraged Shaquille to rest, push fluids, eat healthy and nutritious foods, alternate Tylenol and Ibuprofen PRN, Mucinex for cough, etc. Return to clinic and ER precautions discussed.   - nirmatrelvir and ritonavir (PAXLOVID) 300 mg/100 mg therapy pack; Take 3 tablets by mouth 2 times daily for 5 days.  Dispense: 30 tablet; Refill: 0    Follow up as needed. Reasons to follow-up sooner or seek emergent care reviewed.     Kayla Bah PA-C  [facility]       Subjective     Shaquille Roberts is a 63 year old male who presents to clinic today for the following health issues:    HPI   Chief Complaint   Patient presents with    COVID positive      Shaquille presents via a telehealth visit to discuss testing positive to COVID and Paxlovid.      Date of the visit: 09/10/24  Consent for visit from patient or patient representative: Yes  Who was present at visit: Patient  Category for office visit-real-time audio with video or audio/telephone only: real time audio only (no video as patient had trouble connecting to video call due to his phone and internet connection)  Modality/platform: Clinical Ink  Patient location for the visit: Home ([place], [place])  Provider location for the visit: [facility]  Start time for telehealth encounter: 2:18 PM  End time for telehealth encounter: 2:37 PM  Total time spent on the visit: 19 minutes    Shaquille notes he tested positive to COVID yesterday (09/09/24) however his symptoms started on Sunday evening. He notes his main symptoms include fevers (low grade around 99.1  F), fatigue, productive cough, runny nose, and itchy eyes. He denies any symptoms of body aches, ear pain, sore throat, loss of smell/taste, SOB, wheezing, chest pain, or any abdominal symptoms. He has been taking Advil for his fevers which has been helpful. He has not been around anyone ill that he knows of. Shaquille notes he has had COVID 2 years ago and it was a mild case, denies any ER visits or hospitalizations. He notes he is up to date on his COVID vaccines other than this years booster.     Medical history and daily medications reviewed.       Objective    There were no vitals taken for this visit.  There is no height or weight on file to calculate BMI.    Physical Examination:  Today's visit was audio only, I was not able to see patient on video today. Patient did sound alert and did not sound as though he was in any distress, was appropriately answering all questions.

## 2025-02-07 ENCOUNTER — MYC MEDICAL ADVICE (OUTPATIENT)
Dept: FAMILY MEDICINE | Facility: CLINIC | Age: 65
End: 2025-02-07

## 2025-03-27 DIAGNOSIS — I10 BENIGN ESSENTIAL HYPERTENSION: ICD-10-CM

## 2025-03-27 RX ORDER — IRBESARTAN 75 MG/1
75 TABLET ORAL DAILY
COMMUNITY
Start: 2025-03-27

## 2025-03-27 NOTE — TELEPHONE ENCOUNTER
Refused Prescriptions:                       Disp   Refills    irbesartan (AVAPRO) 75 MG tablet [Pharmacy*                Sig: TAKE 1 TABLET(75 MG) BY MOUTH DAILY  Refused By: MARIA R VALDIVIA  Reason for Refusal: OTHER     Pt is scheduled to see [facility] on 3-28-25   Maria R

## 2025-03-28 PROBLEM — I10 BENIGN ESSENTIAL HYPERTENSION: Status: ACTIVE | Noted: 2025-03-28

## 2025-06-09 DIAGNOSIS — I10 BENIGN ESSENTIAL HYPERTENSION: ICD-10-CM

## 2025-06-10 ENCOUNTER — MYC MEDICAL ADVICE (OUTPATIENT)
Dept: FAMILY MEDICINE | Facility: CLINIC | Age: 65
End: 2025-06-10

## 2025-06-10 DIAGNOSIS — I10 BENIGN ESSENTIAL HYPERTENSION: ICD-10-CM

## 2025-06-10 RX ORDER — IRBESARTAN 150 MG/1
150 TABLET ORAL AT BEDTIME
Qty: 30 TABLET | Refills: 0 | Status: SHIPPED | OUTPATIENT
Start: 2025-06-10

## 2025-06-10 RX ORDER — IRBESARTAN 150 MG/1
150 TABLET ORAL AT BEDTIME
COMMUNITY
Start: 2025-06-10

## 2025-06-10 NOTE — TELEPHONE ENCOUNTER
Refused Prescriptions:                       Disp   Refills    irbesartan (AVAPRO) 150 MG tablet [Pharmac*                Sig: TAKE 1 TABLET(150 MG) BY MOUTH AT BEDTIME  Refused By: MARIA R VALDIVIA  Reason for Refusal: Request already responded to by other means (phone, fax, etc.)    See my chart message   30 days given today   Maria R

## 2025-06-10 NOTE — TELEPHONE ENCOUNTER
Shaquille Roberts is requesting a refill of:    Refused Prescriptions:                       Disp   Refills    irbesartan (AVAPRO) 150 MG tablet [Pharmac*                Sig: TAKE 1 TABLET(150 MG) BY MOUTH AT BEDTIME  Refused By: FRANKLIN RAWLS  Reason for Refusal: Patient needs appointment    Pt due for OV

## 2025-07-07 DIAGNOSIS — I10 BENIGN ESSENTIAL HYPERTENSION: ICD-10-CM

## 2025-07-07 RX ORDER — IRBESARTAN 150 MG/1
150 TABLET ORAL AT BEDTIME
COMMUNITY
Start: 2025-07-07

## 2025-07-07 NOTE — TELEPHONE ENCOUNTER
Refused Prescriptions:                       Disp   Refills    irbesartan (AVAPRO) 150 MG tablet [Pharmac*                Sig: TAKE 1 TABLET(150 MG) BY MOUTH AT BEDTIME  Refused By: MARIA R VALDIVIA  Reason for Refusal: Patient needs appointment    Pt due for ov   My chart message sent on 6-  Maria R

## 2025-07-11 ENCOUNTER — OFFICE VISIT (OUTPATIENT)
Dept: FAMILY MEDICINE | Facility: CLINIC | Age: 65
End: 2025-07-11

## 2025-07-11 VITALS
BODY MASS INDEX: 30.21 KG/M2 | WEIGHT: 216.6 LBS | HEART RATE: 75 BPM | DIASTOLIC BLOOD PRESSURE: 72 MMHG | SYSTOLIC BLOOD PRESSURE: 136 MMHG | OXYGEN SATURATION: 98 % | TEMPERATURE: 97.2 F

## 2025-07-11 DIAGNOSIS — G43.909 MIGRAINE WITHOUT STATUS MIGRAINOSUS, NOT INTRACTABLE, UNSPECIFIED MIGRAINE TYPE: ICD-10-CM

## 2025-07-11 DIAGNOSIS — R53.83 OTHER FATIGUE: ICD-10-CM

## 2025-07-11 DIAGNOSIS — R51.9 FREQUENT HEADACHES: Primary | ICD-10-CM

## 2025-07-11 DIAGNOSIS — I10 BENIGN ESSENTIAL HYPERTENSION: ICD-10-CM

## 2025-07-11 LAB
ERYTHROCYTE [DISTWIDTH] IN BLOOD BY AUTOMATED COUNT: 12.6 %
HCT VFR BLD AUTO: 42.8 % (ref 40–53)
HEMOGLOBIN: 14.2 G/DL (ref 13.3–17.7)
MCH RBC QN AUTO: 31.6 PG (ref 26–33)
MCHC RBC AUTO-ENTMCNC: 33.2 G/DL (ref 31–36)
MCV RBC AUTO: 95.2 FL (ref 78–100)
PLATELET COUNT - QUEST: 327 10^9/L (ref 150–375)
RBC # BLD AUTO: 4.5 10*12/L (ref 4.4–5.9)
WBC # BLD AUTO: 5.2 10*9/L (ref 4–11)

## 2025-07-11 PROCEDURE — 99214 OFFICE O/P EST MOD 30 MIN: CPT | Performed by: PHYSICIAN ASSISTANT

## 2025-07-11 PROCEDURE — 36415 COLL VENOUS BLD VENIPUNCTURE: CPT | Performed by: PHYSICIAN ASSISTANT

## 2025-07-11 PROCEDURE — 85027 COMPLETE CBC AUTOMATED: CPT | Performed by: PHYSICIAN ASSISTANT

## 2025-07-11 PROCEDURE — 80048 BASIC METABOLIC PNL TOTAL CA: CPT | Performed by: PHYSICIAN ASSISTANT

## 2025-07-11 RX ORDER — SUMATRIPTAN 50 MG/1
50-100 TABLET, FILM COATED ORAL
Qty: 18 TABLET | Refills: 0 | Status: SHIPPED | OUTPATIENT
Start: 2025-07-11

## 2025-07-11 RX ORDER — IRBESARTAN 150 MG/1
150 TABLET ORAL AT BEDTIME
Qty: 90 TABLET | Refills: 1 | Status: SHIPPED | OUTPATIENT
Start: 2025-07-11

## 2025-07-11 RX ORDER — TAMSULOSIN HYDROCHLORIDE 0.4 MG/1
CAPSULE ORAL
COMMUNITY

## 2025-07-11 NOTE — NURSING NOTE
Chief Complaint   Patient presents with    Hypertension     BP med check - seems to be better. Concerned that he's been too drowsy as of recent - falling asleep at work. Brought his BP cuff to work and found that his BP was lower than normal around 113/77.     Consult     Also has had 6 migraines in the past week - come on faster and don't last as long. Needs migraine med refilled.      Pre-visit Screening:  Immunizations:  up to date  Colonoscopy:  is up to date  Mammogram: na  Asthma Action Test/Plan:  na  PHQ9:  na  GAD7:  na  Questioned patient about current smoking habits Pt. quit smoking some time ago.  Ok to leave detailed message on voice mail for today's visit only yes, phone # [phone] (home)

## 2025-07-11 NOTE — PROGRESS NOTES
CC: Medication Check, New concerns Fatigue, headaches    History:  HTN:  Pt was last here 3/2025 taking irbesartan 75 mg. BP was still elevated at that time, so agreed to increase irbesartan to 150 mg daily. Pt has been tracking BP since that time, and noticed improvement in the first month of taking and BP went down to 120/70s. Was also consistent getting to gym and running at that time. Tends to run into injuries, where he has to stop exercise, and BP goes up closer to 130-140/76-81.     Fatigue:  In the past month has been having a consistent sense of fatigue. Will be at his computer and fall asleep which is unusual for him. Admits around the beginning of these symptoms was having stronger coffee for about a week. Went back to normal amount of coffee. Also has Diet Coke. Will have 4 servings of these combined daily.     Migraines:  Pt has been getting migraines very frequency the past week. Has had 6 ocular migraines where he gets a blurry area in vision, and as blurriness resolves, pain starts. Pain in whole head. Some light/noise sensitivity, but no nausea. Has distant of migraines many years ago where he would take sumatriptan but these seemed to resolved over time.      PMH, MEDICATIONS, ALLERGIES, SOCIAL AND FAMILY HISTORY in [facility] and reviewed by me personally.    ROS negative other than the symptoms noted above in the HPI.      Examination   /72 (BP Location: Left arm, Patient Position: Sitting, Cuff Size: Adult Regular)   Pulse 75   Temp 97.2  F (36.2  C) (Temporal)   Wt 98.2 kg (216 lb 9.6 oz)   SpO2 98%   BMI 30.21 kg/m         Constitutional: Sitting comfortably, in no acute distress. Vital signs noted  Eyes: pupils equal round reactive to light and accomodation, extra ocular movements intact  Ears: external canals and TMs free of abnormalities  Nose: patent, without mucosal abnormalities  Mouth and throat: without erythema or lesions of the mucosa  Neck:  no adenopathy, trachea midline and  Pt arrived to floor via gurney from PACU, mother of pt at bedside. Admission profile, initial VS, and assessment completed. Pt and family oriented to floor, educated on infection prevention and safety. Board updated, hourly rounding in place.    normal to palpation, thyroid normal to palpation  Cardiovascular:  regular rate and rhythm, no murmurs, clicks, or gallops  Respiratory:  normal respiratory rate and rhythm, lungs clear to auscultation  NEURO:  cranial nerves 2-12 intact, muscle strength normal, rapid alternating movements normal, sensation to light touch and pinprick normal, proprioception normal, reflexes normal and symmetric  SKIN: No jaundice/pallor/rash.   Psychiatric: mentation appears normal and affect normal/bright        A/P    ICD-10-CM    1. Benign essential hypertension  I10       2. Migraine without status migrainosus, not intractable, unspecified migraine type  G43.909           DISCUSSION:  HTN:  BP reasonably controlled in office (no evidence of hypotension), and even better controlled at home. Will update BMP today and send MyChart with results. For now continue on same dose for 6 months.     Fatigue:  Suspect this is related to dehydration. Recommended increase fluids/electrolytes, especially if doing streneous activity in the heat. If this does not help over next several days, recommended trial of daily antihistamine like fexofenadine. Checking CBC and BMP. Contact me in 1 week if not significantly better, or sooner with worsening.     Migraines:  Reassured by normal neurological exam. Agreed to prescribe sumatriptan to use as needed for migraines. Take at first sign. Warned of side effects and to contact me if noted.     follow up visit: 6 months    [facility]

## 2025-07-14 LAB
BUN SERPL-MCNC: 18 MG/DL (ref 7–25)
BUN/CREATININE RATIO: 17 (ref 6–32)
CALCIUM SERPL-MCNC: 8.9 MG/DL (ref 8.6–10.3)
CHLORIDE SERPLBLD-SCNC: 106.9 MMOL/L (ref 98–110)
CO2 SERPL-SCNC: 29.2 MMOL/L (ref 20–32)
CREAT SERPL-MCNC: 1.07 MG/DL (ref 0.6–1.3)
GLUCOSE SERPL-MCNC: 97 MG/DL (ref 60–99)
POTASSIUM SERPL-SCNC: 4.81 MMOL/L (ref 3.5–5.3)
SODIUM SERPL-SCNC: 140.9 MMOL/L (ref 135–146)

## 2025-07-17 ENCOUNTER — MYC MEDICAL ADVICE (OUTPATIENT)
Dept: FAMILY MEDICINE | Facility: CLINIC | Age: 65
End: 2025-07-17

## 2025-07-17 DIAGNOSIS — L23.7 PLANT ALLERGIC CONTACT DERMATITIS: Primary | ICD-10-CM

## 2025-07-17 DIAGNOSIS — L29.89 PRURITIC ERYTHEMATOUS RASH: ICD-10-CM

## 2025-07-26 RX ORDER — PREDNISONE 10 MG/1
10 TABLET ORAL DAILY
Qty: 5 TABLET | Refills: 0 | Status: SHIPPED | OUTPATIENT
Start: 2025-07-26 | End: 2025-07-31

## 2025-07-28 ENCOUNTER — OFFICE VISIT (OUTPATIENT)
Dept: FAMILY MEDICINE | Facility: CLINIC | Age: 65
End: 2025-07-28

## 2025-07-28 VITALS
WEIGHT: 221.8 LBS | TEMPERATURE: 97.2 F | BODY MASS INDEX: 30.93 KG/M2 | DIASTOLIC BLOOD PRESSURE: 70 MMHG | HEART RATE: 79 BPM | OXYGEN SATURATION: 95 % | SYSTOLIC BLOOD PRESSURE: 144 MMHG

## 2025-07-28 DIAGNOSIS — R21 RASH AND NONSPECIFIC SKIN ERUPTION: ICD-10-CM

## 2025-07-28 DIAGNOSIS — L29.89 PRURITIC ERYTHEMATOUS RASH: Primary | ICD-10-CM

## 2025-07-28 PROCEDURE — G2211 COMPLEX E/M VISIT ADD ON: HCPCS

## 2025-07-28 PROCEDURE — 36415 COLL VENOUS BLD VENIPUNCTURE: CPT

## 2025-07-28 PROCEDURE — 99213 OFFICE O/P EST LOW 20 MIN: CPT | Mod: 25

## 2025-07-28 RX ORDER — HYDROXYZINE HYDROCHLORIDE 25 MG/1
25 TABLET, FILM COATED ORAL 3 TIMES DAILY PRN
Qty: 30 TABLET | Refills: 0 | Status: SHIPPED | OUTPATIENT
Start: 2025-07-28

## 2025-07-28 NOTE — PROGRESS NOTES
"Assessment & Plan     1. Pruritic erythematous rash (Primary)  - Unclear etiology of ongoing rash, will have Shaquille see [facility] either in [place] or [place] this week for further evaluation, referral has already been placed. He will let me know if he isn't able to get in for an appointment this week. Will also RX Hydroxyzine in the meantime PRN to help with itching. Will check lymes antibody however discussed rash and symptoms not consistent with lymes. Return to clinic and ER precautions discussed.   - hydrOXYzine HCl (ATARAX) 25 MG tablet; Take 1 tablet (25 mg) by mouth 3 times daily as needed for itching.  Dispense: 30 tablet; Refill: 0    2. Rash and nonspecific skin eruption  - See above.   - VENOUS COLLECTION  - Lymes Antibodies Total ([facility])    Follow up with dermatology. Reasons to follow-up sooner or seek emergent care reviewed.     Kayla Bah PA-C  [facility]       Subjective     Shaquille Roberts is a 64 year old male who presents to clinic today for the following health issues:    HPI   Chief Complaint   Patient presents with    Consult     Spreading rash - concerned about lymes disease. Seen on 7/17. Initially thought it was poison ivy, has been trying to use creams/steroids for relief. Constantly feels like his skin is crawling constantly. Having difficultly sleeping, feels disoriented \"brain fog\".       Shaquille presents for a follow up on a rash. He was seen recently on 07/17/25, please see note for details. He was prescribed a Prednisone taper and has been using Benadryl lotion and daily Allegra with no benefit. Notes rash is spreading and getting worse. Notes has been having some drainage. Continues to have worsening rash on his neck, forearms, lower legs, and stomach. Notes rash is constantly itching, hasn't had any Prednisone in 2 days. Can't sleep because of the itching. Concerned of possible lyme's disease however denies any tick bites. No bed bugs. No other " possible triggers; recent illnesses, new medication, laundry detergents, body washes/soaps, etc.     Daily medications reviewed.       Objective    BP (!) 144/70 (BP Location: Right arm, Patient Position: Sitting, Cuff Size: Adult Large)   Pulse 79   Temp 97.2  F (36.2  C) (Temporal)   Wt 100.6 kg (221 lb 12.8 oz)   SpO2 95%   BMI 30.93 kg/m    Body mass index is 30.93 kg/m .    Physical Examination:  GENERAL: healthy, alert and no distress  EYES: Eyes grossly normal to inspection, PERRL and conjunctivae and sclerae normal  HENT: mouth without ulcers or lesions  NECK: no adenopathy, no asymmetry, masses, or scars and thyroid normal to palpation  RESP: lungs clear to auscultation - no rales, rhonchi or wheezes  CV: regular rate and rhythm, normal S1 S2, no S3 or S4, no murmur, click or rub, no peripheral edema   ABDOMEN: soft and non-tender  MS: no gross musculoskeletal defects noted, no edema  SKIN: erythematous papules with central dryness/peeling in linear formation on left forearm, scattered right forearm, central abdomen, bilateral lower legs, no drainage  PSYCH: mentation appears normal, affect normal/bright    Lab work pending.

## 2025-07-28 NOTE — NURSING NOTE
"Chief Complaint   Patient presents with    Consult     Spreading rash - concerned about lymes disease. Seen on 7/17. Initially thought it was poison ivy, has been trying to use creams/steroids for relief. Constantly feels like his skin is crawling constantly. Having difficultly sleeping, feels disoriented \"brain fog\".      Pre-visit Screening:  Immunizations:  up to date  Colonoscopy:  is up to date  Mammogram: na  Asthma Action Test/Plan:  na  PHQ9:  na  GAD7:  na  Questioned patient about current smoking habits Pt. quit smoking some time ago.  Ok to leave detailed message on voice mail for today's visit only yes, phone # [phone] (home)       "

## 2025-07-29 LAB — LYME SCREEN IGG AND IGM: <0.9 INDEX

## 2025-07-30 ENCOUNTER — TRANSFERRED RECORDS (OUTPATIENT)
Dept: FAMILY MEDICINE | Facility: CLINIC | Age: 65
End: 2025-07-30

## 2025-08-20 ENCOUNTER — MYC MEDICAL ADVICE (OUTPATIENT)
Dept: FAMILY MEDICINE | Facility: CLINIC | Age: 65
End: 2025-08-20